# Patient Record
Sex: FEMALE | Race: WHITE | NOT HISPANIC OR LATINO | Employment: OTHER | URBAN - METROPOLITAN AREA
[De-identification: names, ages, dates, MRNs, and addresses within clinical notes are randomized per-mention and may not be internally consistent; named-entity substitution may affect disease eponyms.]

---

## 2017-05-15 ENCOUNTER — OFFICE VISIT (OUTPATIENT)
Dept: NEUROLOGY | Facility: CLINIC | Age: 62
End: 2017-05-15

## 2017-05-15 ENCOUNTER — APPOINTMENT (OUTPATIENT)
Dept: LAB | Facility: HOSPITAL | Age: 62
End: 2017-05-15

## 2017-05-15 VITALS
DIASTOLIC BLOOD PRESSURE: 74 MMHG | WEIGHT: 128.2 LBS | SYSTOLIC BLOOD PRESSURE: 116 MMHG | HEART RATE: 67 BPM | BODY MASS INDEX: 25.17 KG/M2 | OXYGEN SATURATION: 98 % | HEIGHT: 60 IN

## 2017-05-15 DIAGNOSIS — G40.009 PARTIAL IDIOPATHIC EPILEPSY WITH SEIZURES OF LOCALIZED ONSET, NOT INTRACTABLE, WITHOUT STATUS EPILEPTICUS (HCC): Primary | ICD-10-CM

## 2017-05-15 DIAGNOSIS — T50.905D ADVERSE DRUG EFFECT, SUBSEQUENT ENCOUNTER: ICD-10-CM

## 2017-05-15 LAB — PHENYTOIN SERPL-MCNC: 22.8 MCG/ML (ref 10–20)

## 2017-05-15 PROCEDURE — 80185 ASSAY OF PHENYTOIN TOTAL: CPT | Performed by: PSYCHIATRY & NEUROLOGY

## 2017-05-15 PROCEDURE — 80186 ASSAY OF PHENYTOIN FREE: CPT | Performed by: PSYCHIATRY & NEUROLOGY

## 2017-05-15 PROCEDURE — 36415 COLL VENOUS BLD VENIPUNCTURE: CPT | Performed by: PSYCHIATRY & NEUROLOGY

## 2017-05-15 PROCEDURE — 99213 OFFICE O/P EST LOW 20 MIN: CPT | Performed by: PSYCHIATRY & NEUROLOGY

## 2017-05-15 PROCEDURE — 80203 DRUG SCREEN QUANT ZONISAMIDE: CPT | Performed by: PSYCHIATRY & NEUROLOGY

## 2017-05-17 LAB — PHENYTOIN FREE SERPL-MCNC: 1.7 UG/ML (ref 1–2)

## 2017-05-18 LAB — ZONISAMIDE SERPL-MCNC: 9.4 UG/ML (ref 10–40)

## 2017-06-26 RX ORDER — PHENYTOIN SODIUM 100 MG/1
CAPSULE, EXTENDED RELEASE ORAL
Qty: 270 CAPSULE | Refills: 2 | Status: SHIPPED | OUTPATIENT
Start: 2017-06-26 | End: 2018-08-19 | Stop reason: SDUPTHER

## 2017-08-31 RX ORDER — EXTENDED PHENYTOIN SODIUM 30 MG/1
CAPSULE ORAL
Qty: 90 CAPSULE | Refills: 3 | Status: SHIPPED | OUTPATIENT
Start: 2017-08-31 | End: 2018-10-11 | Stop reason: SDUPTHER

## 2017-08-31 RX ORDER — ZONISAMIDE 100 MG/1
CAPSULE ORAL
Qty: 270 CAPSULE | Refills: 3 | Status: SHIPPED | OUTPATIENT
Start: 2017-08-31 | End: 2018-10-11 | Stop reason: SDUPTHER

## 2018-05-01 ENCOUNTER — APPOINTMENT (OUTPATIENT)
Dept: LAB | Facility: HOSPITAL | Age: 63
End: 2018-05-01

## 2018-05-01 ENCOUNTER — OFFICE VISIT (OUTPATIENT)
Dept: NEUROLOGY | Facility: CLINIC | Age: 63
End: 2018-05-01

## 2018-05-01 VITALS
WEIGHT: 131 LBS | BODY MASS INDEX: 25.72 KG/M2 | HEART RATE: 70 BPM | SYSTOLIC BLOOD PRESSURE: 136 MMHG | OXYGEN SATURATION: 95 % | HEIGHT: 60 IN | DIASTOLIC BLOOD PRESSURE: 78 MMHG

## 2018-05-01 DIAGNOSIS — G40.009 PARTIAL IDIOPATHIC EPILEPSY WITH SEIZURES OF LOCALIZED ONSET, NOT INTRACTABLE, WITHOUT STATUS EPILEPTICUS (HCC): Primary | ICD-10-CM

## 2018-05-01 LAB — PHENYTOIN SERPL-MCNC: 23.5 MCG/ML (ref 10–20)

## 2018-05-01 PROCEDURE — 80185 ASSAY OF PHENYTOIN TOTAL: CPT | Performed by: PSYCHIATRY & NEUROLOGY

## 2018-05-01 PROCEDURE — 80186 ASSAY OF PHENYTOIN FREE: CPT | Performed by: PSYCHIATRY & NEUROLOGY

## 2018-05-01 PROCEDURE — 80203 DRUG SCREEN QUANT ZONISAMIDE: CPT | Performed by: PSYCHIATRY & NEUROLOGY

## 2018-05-01 PROCEDURE — 36415 COLL VENOUS BLD VENIPUNCTURE: CPT | Performed by: PSYCHIATRY & NEUROLOGY

## 2018-05-01 PROCEDURE — 99213 OFFICE O/P EST LOW 20 MIN: CPT | Performed by: PSYCHIATRY & NEUROLOGY

## 2018-05-01 NOTE — PROGRESS NOTES
Subjective:     Patient ID: Daylin Rodriguez is a 63 y.o. female.    History of Present Illness  The following portions of the patient's history were reviewed and updated as appropriate: allergies, current medications, past family history, past medical history, past social history, past surgical history and problem list.    Epilepsy: Onset in 1997 without aura, described as a generalized tonic-clonic seizure.Idiopathic.  EEG showed mild left temporal slowing. Brain MRI and CT were normal. She was started on Dilantin. Another seizure the following year occurred with a Dilantin level of 15 but she had been sleep deprived.     Over the next decade dil levels fluctuated. The dose sometimes dropped as low as 300 per day or as high as 330 per day -- she had more auras in 2008 requiring another dose increase, as the Dilantin level was only 14.      Next seizure  was treated at the [facility] in March 2011. The Dilantin level was 12.5. Due to that history I added zonisamide.      She has done well with this combination without seizures or auras.     Current dose is dil branded, 300 mg daily and 30 mg every other day. Zonisamide is a generic 100 mg capsules. The doses 3 at night. She notes no side effects. There are no timing issues other than sleep deprivation can aggravate her seizures.     AE- mild ataxia noted in the past, all stable. Mild chronic stable memory sx as well, from epilpssy. NO  PN.  Review of Systems   Constitutional: Negative for activity change, appetite change and fatigue.   HENT: Negative for ear pain, facial swelling and trouble swallowing.    Eyes: Negative for photophobia, pain and visual disturbance.   Respiratory: Negative for choking, chest tightness and shortness of breath.    Cardiovascular: Negative for chest pain, palpitations and leg swelling.   Gastrointestinal: Negative for abdominal pain, constipation and nausea.   Endocrine: Negative for polydipsia and polyuria.    Genitourinary: Negative for difficulty urinating, frequency and urgency.   Musculoskeletal: Negative for back pain, gait problem and neck pain.   Skin: Negative for color change, rash and wound.   Allergic/Immunologic: Negative for environmental allergies, food allergies and immunocompromised state.   Neurological: Negative for dizziness, tremors, seizures, syncope, facial asymmetry, speech difficulty, weakness, light-headedness, numbness and headaches.   Hematological: Negative for adenopathy. Does not bruise/bleed easily.   Psychiatric/Behavioral: Negative for agitation, behavioral problems, confusion, decreased concentration, dysphoric mood, hallucinations, self-injury, sleep disturbance and suicidal ideas. The patient is not nervous/anxious and is not hyperactive.         Objective:    Neurologic Exam     Mental Status   Oriented to person, place, and time.   Attention: decreased. Concentration: decreased.   Speech: speech is normal   Level of consciousness: alert  Knowledge: good.   Able to name object. Able to read. Able to repeat. Able to write. Normal comprehension.     Cranial Nerves     CN II   Visual fields full to confrontation.     CN III, IV, VI   Pupils are equal, round, and reactive to light.    CN VII   Facial expression full, symmetric.     CN VIII   CN VIII normal.     CN XI   CN XI normal.     CN XII   CN XII normal.   Normal fundi     Motor Exam   Muscle bulk: normal  Overall muscle tone: normal    Sensory Exam   Light touch normal.   Vibration normal.     Gait, Coordination, and Reflexes     Gait  Gait: normal    Coordination   Romberg: negative  Finger to nose coordination: normal  Tandem walking coordination: normal    Tremor   Resting tremor: absent  Intention tremor: absent  Action tremor: absent    Reflexes   Right brachioradialis: 1+  Left brachioradialis: 1+  Right biceps: 1+  Left biceps: 1+  Right triceps: 1+  Left triceps: 1+  Right patellar: 1+  Left patellar: 1+  Right achilles:  1+  Left achilles: 1+  Right : 1+  Left : 1+      Physical Exam   Constitutional: She is oriented to person, place, and time. She appears well-developed and well-nourished.   Eyes: Pupils are equal, round, and reactive to light.   Neck: Normal range of motion. Neck supple.   Cardiovascular: Normal rate.    Pulmonary/Chest: Effort normal.   Neurological: She is oriented to person, place, and time. She has a normal Finger-Nose-Finger Test, a normal Romberg Test and a normal Tandem Gait Test. Gait normal.   Reflex Scores:       Tricep reflexes are 1+ on the right side and 1+ on the left side.       Bicep reflexes are 1+ on the right side and 1+ on the left side.       Brachioradialis reflexes are 1+ on the right side and 1+ on the left side.       Patellar reflexes are 1+ on the right side and 1+ on the left side.       Achilles reflexes are 1+ on the right side and 1+ on the left side.  Psychiatric: She has a normal mood and affect. Her speech is normal and behavior is normal. Judgment and thought content normal.   Nursing note and vitals reviewed.      Assessment/Plan:       Problems Addressed this Visit        Unprioritized    Partial idiopathic epilepsy with seizures of localized onset, not intractable, without status epilepticus - Primary    Relevant Orders    Zonisamide Level    Phenytoin Level, Free    Phenytoin Level, Total      Other Visit Diagnoses    None.            She is doing well.  Blood levels reordered for today including Dilantin free level.  No driving restrictions.  Follow-up annual.

## 2018-05-03 LAB
PHENYTOIN FREE SERPL-MCNC: 1.6 UG/ML (ref 1–2)
ZONISAMIDE SERPL-MCNC: 10.9 UG/ML (ref 10–40)

## 2018-08-20 RX ORDER — PHENYTOIN SODIUM 100 MG/1
CAPSULE, EXTENDED RELEASE ORAL
Qty: 270 CAPSULE | Refills: 2 | Status: SHIPPED | OUTPATIENT
Start: 2018-08-20 | End: 2019-06-10 | Stop reason: SDUPTHER

## 2018-10-12 RX ORDER — ZONISAMIDE 100 MG/1
CAPSULE ORAL
Qty: 270 CAPSULE | Refills: 3 | Status: SHIPPED | OUTPATIENT
Start: 2018-10-12 | End: 2018-10-12 | Stop reason: SDUPTHER

## 2018-10-12 RX ORDER — EXTENDED PHENYTOIN SODIUM 30 MG/1
CAPSULE ORAL
Qty: 90 CAPSULE | Refills: 3 | Status: SHIPPED | OUTPATIENT
Start: 2018-10-12 | End: 2019-06-10 | Stop reason: SDUPTHER

## 2018-10-12 RX ORDER — ZONISAMIDE 100 MG/1
300 CAPSULE ORAL DAILY
Qty: 30 CAPSULE | Refills: 0 | Status: SHIPPED | OUTPATIENT
Start: 2018-10-12 | End: 2019-06-10 | Stop reason: SDUPTHER

## 2019-06-10 ENCOUNTER — APPOINTMENT (OUTPATIENT)
Dept: LAB | Facility: HOSPITAL | Age: 64
End: 2019-06-10

## 2019-06-10 ENCOUNTER — OFFICE VISIT (OUTPATIENT)
Dept: NEUROLOGY | Facility: CLINIC | Age: 64
End: 2019-06-10

## 2019-06-10 VITALS
OXYGEN SATURATION: 98 % | BODY MASS INDEX: 25.32 KG/M2 | HEIGHT: 60 IN | DIASTOLIC BLOOD PRESSURE: 70 MMHG | WEIGHT: 129 LBS | SYSTOLIC BLOOD PRESSURE: 100 MMHG | HEART RATE: 80 BPM

## 2019-06-10 DIAGNOSIS — R27.0: ICD-10-CM

## 2019-06-10 DIAGNOSIS — M85.80 OSTEOPENIA DETERMINED BY X-RAY: ICD-10-CM

## 2019-06-10 DIAGNOSIS — T50.905D ADVERSE DRUG EFFECT, SUBSEQUENT ENCOUNTER: ICD-10-CM

## 2019-06-10 DIAGNOSIS — G40.009 PARTIAL IDIOPATHIC EPILEPSY WITH SEIZURES OF LOCALIZED ONSET, NOT INTRACTABLE, WITHOUT STATUS EPILEPTICUS (HCC): Primary | ICD-10-CM

## 2019-06-10 LAB — PHENYTOIN SERPL-MCNC: 21.3 MCG/ML (ref 10–20)

## 2019-06-10 PROCEDURE — 80186 ASSAY OF PHENYTOIN FREE: CPT | Performed by: PSYCHIATRY & NEUROLOGY

## 2019-06-10 PROCEDURE — 99214 OFFICE O/P EST MOD 30 MIN: CPT | Performed by: PSYCHIATRY & NEUROLOGY

## 2019-06-10 PROCEDURE — 80185 ASSAY OF PHENYTOIN TOTAL: CPT | Performed by: PSYCHIATRY & NEUROLOGY

## 2019-06-10 PROCEDURE — 80203 DRUG SCREEN QUANT ZONISAMIDE: CPT | Performed by: PSYCHIATRY & NEUROLOGY

## 2019-06-10 PROCEDURE — 36415 COLL VENOUS BLD VENIPUNCTURE: CPT | Performed by: PSYCHIATRY & NEUROLOGY

## 2019-06-10 RX ORDER — PHENYTOIN SODIUM 100 MG/1
300 CAPSULE, EXTENDED RELEASE ORAL DAILY
Qty: 270 CAPSULE | Refills: 2 | Status: SHIPPED | OUTPATIENT
Start: 2019-06-10 | End: 2019-11-19 | Stop reason: SDUPTHER

## 2019-06-10 RX ORDER — CETIRIZINE HYDROCHLORIDE 10 MG/1
10 TABLET ORAL AS NEEDED
COMMUNITY

## 2019-06-10 RX ORDER — EXTENDED PHENYTOIN SODIUM 30 MG/1
30 CAPSULE ORAL DAILY
Qty: 90 CAPSULE | Refills: 3 | Status: SHIPPED | OUTPATIENT
Start: 2019-06-10 | End: 2020-02-18 | Stop reason: SDUPTHER

## 2019-06-10 RX ORDER — ZONISAMIDE 100 MG/1
300 CAPSULE ORAL DAILY
Qty: 270 CAPSULE | Refills: 3 | Status: SHIPPED | OUTPATIENT
Start: 2019-06-10 | End: 2019-11-19 | Stop reason: SDUPTHER

## 2019-06-10 NOTE — PROGRESS NOTES
Subjective:     Patient ID: Daylin Rodriguez is a 64 y.o. female.    History of Present Illness  The following portions of the patient's history were reviewed and updated as appropriate: allergies, current medications, past family history, past medical history, past social history, past surgical history and problem list.  Epilepsy: Onset in 1997 without aura, described as a generalized tonic-clonic seizure      .Idiopathic.         EEG showed mild left temporal slowing.    Brain MRI and CT were normal. She was started on Dilantin.    There were no timing issues other than sleep deprivation can aggravate the seizures.     She had more auras in 2008 requiring another dose increase.  The Dilantin level was only 14.      The next seizure  was treated at the [facility] in March 2011. The Dilantin level was 12.5.                   Due to that history I added zonisamide.      She has done well with this combination of brand-name Dilantin and generic zonisamide without seizures or aura  since the addition of zonisamide in 2011     Current dose is  Dilantin branded, 330 milligrams on 4 days/week and 300 mg on 3 days/week Zonisamide is a generic 100 mg capsules @  3 at night. She notes no side effects.        AE- mild ataxia noted in the past, all stable. Mild chronic stable memory sx as well, from epilepsy. NO  PN.        Osteopenia- on Dexa from 2017, likely an AE of phenytoin. PCP treats with Calcium.    Review of systems discussed with patient.  Lab review from last year shows a free Dilantin level of 1.6 in May 2018  Review of Systems   Constitutional: Negative for activity change, appetite change and fatigue.   HENT: Negative for ear pain, facial swelling and trouble swallowing.    Eyes: Negative for photophobia, pain and visual disturbance.   Respiratory: Negative for choking, chest tightness and shortness of breath.    Cardiovascular: Negative for chest pain, palpitations and leg swelling.    Gastrointestinal: Negative for abdominal pain, constipation and nausea.   Endocrine: Negative for polydipsia, polyphagia and polyuria.   Genitourinary: Negative for difficulty urinating, genital sores and urgency.   Musculoskeletal: Negative for back pain, gait problem and neck pain.   Skin: Negative for color change, rash and wound.   Allergic/Immunologic: Negative for environmental allergies, food allergies and immunocompromised state.   Neurological: Negative for dizziness, tremors, seizures, syncope, facial asymmetry, speech difficulty, weakness, light-headedness, numbness and headaches.   Hematological: Negative for adenopathy. Does not bruise/bleed easily.   Psychiatric/Behavioral: Negative for agitation, behavioral problems, confusion, decreased concentration, dysphoric mood, hallucinations, self-injury, sleep disturbance and suicidal ideas. The patient is not nervous/anxious and is not hyperactive.         Objective:  Neurologic Exam      Mental Status   Oriented to person, place, and time.   Attention: decreased. Concentration: decreased.   Speech: speech is normal   Level of consciousness: alert  Knowledge: good.   Able to name object. Able to read. Able to repeat. Able to write. Normal comprehension.      Cranial Nerves      CN II   Visual fields full to confrontation.      CN III, IV, VI   Pupils are equal, round, and reactive to light at 4 mm     CN VII   Facial expression full, symmetric.      CN VIII   CN VIII normal.      CN XI   CN XI normal.      CN XII   CN XII normal.   Normal fundi      Motor Exam   Muscle bulk: normal  Overall muscle tone: normal     Sensory Exam   Light touch normal.   Vibration normal.  Even at the feet     Gait, Coordination, and Reflexes      Gait-very slightly wide-based.  Turns normally     Coordination   Romberg: negative  Finger to nose coordination: normal  Tandem walking coordination: normal     Tremor   Resting tremor: absent  Intention tremor: absent  Action  tremor: absent     Reflexes   Right brachioradialis: 1+  Left brachioradialis: 1+  Right biceps: 1+  Left biceps: 1+  Right triceps: 1+  Left triceps: 1+  Right patellar: tr  Left patellar: tr  Right achilles: 0  Left achilles: 0           Physical Exam   Constitutional: She is oriented to person, place, and time. She appears well-developed and well-nourished.   Eyes: Pupils are equal, round, and reactive to light.   Neck: Normal range of motion. Neck supple.   Cardiovascular: Normal rate.    Pulmonary/Chest: Effort normal.   Neurological: She is oriented to person, place, and time. She has a normal Finger-Nose-Finger Test, a normal Romberg Test and a normal Tandem Gait Test. Gait  is very slightly wide-based.  Turning is normal    Psychiatric: She has a normal mood and affect. Her speech is normal and behavior is normal. Judgment and thought content normal.   Nursing note and vitals reviewed.      Assessment/Plan:       Problems Addressed this Visit        Unprioritized    Partial idiopathic epilepsy with seizures of localized onset, not intractable, without status epilepticus (CMS/[facility]) - Primary    Relevant Medications    zonisamide (ZONEGRAN) 100 MG capsule    DILANTIN 30 MG ER capsule    DILANTIN 100 MG ER capsule    Other Relevant Orders    Phenytoin Level, Total    Phenytoin Level, Free    Zonisamide Level    Adverse drug effect, subsequent encounter    Ataxia on examination    Osteopenia determined by x-ray         In summary she has had a lifelong history of Dilantin use resulting in hyporeflexia mild ataxia osteopenia.  Seizure control was incomplete until zonisamide was added.  She does not want to switch off of brand-name Dilantin.    She will remain on the same dose of these medicines as reflected above with a repeat set of blood levels today.  Her osteopenia is being managed by her primary care physician.  The mild ataxia is an exam finding is does not seem to bother her clinically.    She wants to  follow-up with 1 of our physicians next year at the [facility].

## 2019-06-12 LAB
PHENYTOIN FREE SERPL-MCNC: 1.3 UG/ML (ref 1–2)
ZONISAMIDE SERPL-MCNC: 12.1 UG/ML (ref 10–40)

## 2019-11-19 RX ORDER — ZONISAMIDE 100 MG/1
300 CAPSULE ORAL DAILY
Qty: 270 CAPSULE | Refills: 3 | Status: SHIPPED | OUTPATIENT
Start: 2019-11-19 | End: 2020-02-18 | Stop reason: SDUPTHER

## 2019-11-19 RX ORDER — PHENYTOIN SODIUM 100 MG/1
300 CAPSULE, EXTENDED RELEASE ORAL DAILY
Qty: 270 CAPSULE | Refills: 2 | Status: SHIPPED | OUTPATIENT
Start: 2019-11-19 | End: 2020-02-18 | Stop reason: SDUPTHER

## 2019-11-19 NOTE — TELEPHONE ENCOUNTER
Patient has changed insurance & can no longer use [facility]. She is needing a new Rx sent to [facility] on file.

## 2020-02-17 RX ORDER — ZONISAMIDE 100 MG/1
300 CAPSULE ORAL DAILY
Qty: 270 CAPSULE | Refills: 3 | Status: CANCELLED | OUTPATIENT
Start: 2020-02-17

## 2020-02-17 RX ORDER — EXTENDED PHENYTOIN SODIUM 30 MG/1
30 CAPSULE ORAL DAILY
Qty: 90 CAPSULE | Refills: 3 | Status: CANCELLED | OUTPATIENT
Start: 2020-02-17

## 2020-02-17 RX ORDER — PHENYTOIN SODIUM 100 MG/1
300 CAPSULE, EXTENDED RELEASE ORAL DAILY
Qty: 270 CAPSULE | Refills: 2 | Status: CANCELLED | OUTPATIENT
Start: 2020-02-17

## 2020-02-17 NOTE — TELEPHONE ENCOUNTER
PATIENT NEEDS REFILLS ON 3 MEDS AND I HAVE UPDATED THE PHARMACY TO THE [facility] ON [facility].    [phone]

## 2020-02-18 ENCOUNTER — TELEPHONE (OUTPATIENT)
Dept: NEUROLOGY | Facility: CLINIC | Age: 65
End: 2020-02-18

## 2020-02-18 RX ORDER — ZONISAMIDE 100 MG/1
300 CAPSULE ORAL DAILY
Qty: 270 CAPSULE | Refills: 1 | Status: SHIPPED | OUTPATIENT
Start: 2020-02-18 | End: 2020-02-19 | Stop reason: SDUPTHER

## 2020-02-18 RX ORDER — PHENYTOIN SODIUM 100 MG/1
300 CAPSULE, EXTENDED RELEASE ORAL DAILY
Qty: 270 CAPSULE | Refills: 1 | Status: SHIPPED | OUTPATIENT
Start: 2020-02-18 | End: 2020-02-19 | Stop reason: SDUPTHER

## 2020-02-18 RX ORDER — EXTENDED PHENYTOIN SODIUM 30 MG/1
30 CAPSULE ORAL DAILY
Qty: 90 CAPSULE | Refills: 1 | Status: SHIPPED | OUTPATIENT
Start: 2020-02-18 | End: 2020-02-19 | Stop reason: SDUPTHER

## 2020-02-18 NOTE — TELEPHONE ENCOUNTER
PT CALLED YESTERDAY REGARDING 3 RX REFILLS, SHE WANTED TO CALL TO MAKE [facility].     [phone]

## 2020-02-19 RX ORDER — ZONISAMIDE 100 MG/1
300 CAPSULE ORAL DAILY
Qty: 270 CAPSULE | Refills: 1 | Status: SHIPPED | OUTPATIENT
Start: 2020-02-19 | End: 2020-08-25

## 2020-02-19 RX ORDER — EXTENDED PHENYTOIN SODIUM 30 MG/1
30 CAPSULE ORAL DAILY
Qty: 90 CAPSULE | Refills: 1 | Status: SHIPPED | OUTPATIENT
Start: 2020-02-19 | End: 2020-11-17

## 2020-02-19 RX ORDER — PHENYTOIN SODIUM 100 MG/1
300 CAPSULE, EXTENDED RELEASE ORAL DAILY
Qty: 270 CAPSULE | Refills: 1 | Status: SHIPPED | OUTPATIENT
Start: 2020-02-19 | End: 2020-08-25

## 2020-06-05 ENCOUNTER — OFFICE VISIT (OUTPATIENT)
Dept: NEUROLOGY | Facility: CLINIC | Age: 65
End: 2020-06-05

## 2020-06-05 ENCOUNTER — LAB (OUTPATIENT)
Dept: LAB | Facility: HOSPITAL | Age: 65
End: 2020-06-05

## 2020-06-05 VITALS
WEIGHT: 129 LBS | SYSTOLIC BLOOD PRESSURE: 104 MMHG | HEIGHT: 60 IN | HEART RATE: 69 BPM | BODY MASS INDEX: 25.32 KG/M2 | OXYGEN SATURATION: 98 % | DIASTOLIC BLOOD PRESSURE: 70 MMHG

## 2020-06-05 DIAGNOSIS — G40.009 PARTIAL IDIOPATHIC EPILEPSY WITH SEIZURES OF LOCALIZED ONSET, NOT INTRACTABLE, WITHOUT STATUS EPILEPTICUS (HCC): Primary | ICD-10-CM

## 2020-06-05 LAB
ALBUMIN SERPL-MCNC: 4.4 G/DL (ref 3.5–5.2)
ALBUMIN/GLOB SERPL: 1.8 G/DL
ALP SERPL-CCNC: 99 U/L (ref 39–117)
ALT SERPL W P-5'-P-CCNC: 25 U/L (ref 1–33)
ANION GAP SERPL CALCULATED.3IONS-SCNC: 8.9 MMOL/L (ref 5–15)
AST SERPL-CCNC: 23 U/L (ref 1–32)
BASOPHILS # BLD AUTO: 0.03 10*3/MM3 (ref 0–0.2)
BASOPHILS NFR BLD AUTO: 0.6 % (ref 0–1.5)
BILIRUB SERPL-MCNC: <0.2 MG/DL (ref 0.2–1.2)
BUN BLD-MCNC: 14 MG/DL (ref 8–23)
BUN/CREAT SERPL: 20.3 (ref 7–25)
CALCIUM SPEC-SCNC: 8.9 MG/DL (ref 8.6–10.5)
CHLORIDE SERPL-SCNC: 106 MMOL/L (ref 98–107)
CO2 SERPL-SCNC: 25.1 MMOL/L (ref 22–29)
CREAT BLD-MCNC: 0.69 MG/DL (ref 0.57–1)
DEPRECATED RDW RBC AUTO: 49.4 FL (ref 37–54)
EOSINOPHIL # BLD AUTO: 0.05 10*3/MM3 (ref 0–0.4)
EOSINOPHIL NFR BLD AUTO: 1 % (ref 0.3–6.2)
ERYTHROCYTE [DISTWIDTH] IN BLOOD BY AUTOMATED COUNT: 13.6 % (ref 12.3–15.4)
FOLATE SERPL-MCNC: 7.92 NG/ML (ref 4.78–24.2)
GFR SERPL CREATININE-BSD FRML MDRD: 85 ML/MIN/1.73
GLOBULIN UR ELPH-MCNC: 2.5 GM/DL
GLUCOSE BLD-MCNC: 81 MG/DL (ref 65–99)
HCT VFR BLD AUTO: 38.6 % (ref 34–46.6)
HGB BLD-MCNC: 13.1 G/DL (ref 12–15.9)
IMM GRANULOCYTES # BLD AUTO: 0.01 10*3/MM3 (ref 0–0.05)
IMM GRANULOCYTES NFR BLD AUTO: 0.2 % (ref 0–0.5)
LYMPHOCYTES # BLD AUTO: 2.41 10*3/MM3 (ref 0.7–3.1)
LYMPHOCYTES NFR BLD AUTO: 46.3 % (ref 19.6–45.3)
MCH RBC QN AUTO: 32.9 PG (ref 26.6–33)
MCHC RBC AUTO-ENTMCNC: 33.9 G/DL (ref 31.5–35.7)
MCV RBC AUTO: 97 FL (ref 79–97)
MONOCYTES # BLD AUTO: 0.51 10*3/MM3 (ref 0.1–0.9)
MONOCYTES NFR BLD AUTO: 9.8 % (ref 5–12)
NEUTROPHILS # BLD AUTO: 2.2 10*3/MM3 (ref 1.7–7)
NEUTROPHILS NFR BLD AUTO: 42.1 % (ref 42.7–76)
NRBC BLD AUTO-RTO: 0 /100 WBC (ref 0–0.2)
PLATELET # BLD AUTO: 219 10*3/MM3 (ref 140–450)
PMV BLD AUTO: 10.3 FL (ref 6–12)
POTASSIUM BLD-SCNC: 3.9 MMOL/L (ref 3.5–5.2)
PROT SERPL-MCNC: 6.9 G/DL (ref 6–8.5)
RBC # BLD AUTO: 3.98 10*6/MM3 (ref 3.77–5.28)
SODIUM BLD-SCNC: 140 MMOL/L (ref 136–145)
WBC NRBC COR # BLD: 5.21 10*3/MM3 (ref 3.4–10.8)

## 2020-06-05 PROCEDURE — 80053 COMPREHEN METABOLIC PANEL: CPT | Performed by: PSYCHIATRY & NEUROLOGY

## 2020-06-05 PROCEDURE — 36415 COLL VENOUS BLD VENIPUNCTURE: CPT | Performed by: PSYCHIATRY & NEUROLOGY

## 2020-06-05 PROCEDURE — 80186 ASSAY OF PHENYTOIN FREE: CPT | Performed by: PSYCHIATRY & NEUROLOGY

## 2020-06-05 PROCEDURE — 82746 ASSAY OF FOLIC ACID SERUM: CPT | Performed by: PSYCHIATRY & NEUROLOGY

## 2020-06-05 PROCEDURE — 80185 ASSAY OF PHENYTOIN TOTAL: CPT | Performed by: PSYCHIATRY & NEUROLOGY

## 2020-06-05 PROCEDURE — 99214 OFFICE O/P EST MOD 30 MIN: CPT | Performed by: PSYCHIATRY & NEUROLOGY

## 2020-06-05 PROCEDURE — 85025 COMPLETE CBC W/AUTO DIFF WBC: CPT | Performed by: PSYCHIATRY & NEUROLOGY
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
## 2020-06-05 NOTE — PATIENT INSTRUCTIONS
[facility]  Deja Parsons MD  Neurology clinic  [phone]    With anti-seizure medications, you may initially notice side effects of fatigue, drowsiness, unsteadiness, and dizziness.  Other possible side effects include nausea, abdominal pain, headache, blurry or double vision, slurred speech and mood changes.  Generally, patients will noticed these symptoms when the medication is first started or with higher doses and will go away with time.    It is import to consistently take your medication every day.  Missing just one dose may put you at risk for a breakthrough seizure.  Consider using reminders on your phone or a pill box.    If you develop a rash, please call the neurology clinic immediately or notify another healthcare professional, as this may be potentially life-threatening.  If you are unable to reach a healthcare professional, go to the emergency room immediately for further evaluation.    If you develop thoughts of wanting to hurt yourself or others, please call the neurology clinic immediately to notify another healthcare professional.  If you are unable to reach a healthcare professional, go to the emergency room immediately for further evaluation.    Taking anti-seizure medications may increase the risk of birth defects.  If you are a female of child-bearing potential, it is recommended that you take folic acid (1-4 mg) daily.  This may reduce the risk of birth defects while pregnant and taking seizure medication.  If you become pregnant, contact our office immediately. You will need to be followed very closely (at least monthly appointments).  I also recommend contacting The North American Antiepileptic Drug Pregnancy Registry at www.aedpregnancyregistry.org or 1-461.579.1628.    It is the [place] state law that you cannot drive within 90 days of a seizure.    You should avoid certain activities that if you were to have a seizure, you could harm yourself or others. In  general, it is recommended that you avoid operating heavy machinery or power tools, swimming or taking baths by yourself (showers are ok), don't stand over open flames, don't get on high ladders or the roof.  I also recommend to avoid sleeping on your stomach.    For further information on epilepsy and resources available to patients and their families, please visit the [facility] at www.efky.org or call [phone].    **Check out the [facility]'s monthly Art Group Gathering.  They are [facility] at [facility], [address] [place] [address].  Call Ceci Alves at [phone] or email her at [email or web address] for the dates of future gatherings.**      **If you have having memory problems, consider HOBSCOTCH (Home-Based Self-management and Cognitive Training Changes lives).  It is an 8 week self-management program for adults with epilepsy and memory problems.  The program is free at the [facility].  Contact Sarahi Ames at [phone] or [email or web address].**    Check out My Epilepsy Story (MyEpilepsyStory.org).  Their goal is to foster the growth of young girls and women affected by epilepsy and encourage them not just to lives, but to THRIVE!  You can get involved by donating, sharing your story, or becoming an ambassador.  Services include educational resources and transportation.

## 2020-06-08 LAB
PHENYTOIN FREE SERPL-MCNC: 1.2 UG/ML (ref 1–2)
PHENYTOIN SERPL-MCNC: 19.2 UG/ML (ref 10–20)

## 2020-08-25 RX ORDER — PHENYTOIN SODIUM 100 MG/1
CAPSULE, EXTENDED RELEASE ORAL
Qty: 270 CAPSULE | Refills: 0 | Status: SHIPPED | OUTPATIENT
Start: 2020-08-25 | End: 2020-11-17

## 2020-08-25 RX ORDER — ZONISAMIDE 100 MG/1
CAPSULE ORAL
Qty: 270 CAPSULE | Refills: 0 | Status: SHIPPED | OUTPATIENT
Start: 2020-08-25 | End: 2020-11-17

## 2020-11-09 ENCOUNTER — PATIENT MESSAGE (OUTPATIENT)
Dept: NEUROLOGY | Facility: CLINIC | Age: 65
End: 2020-11-09

## 2020-11-10 NOTE — TELEPHONE ENCOUNTER
"From: Daylin Rodriguez  To: Deja Parsons MD  Sent: 11/9/2020 8:06 PM EST  Subject: Non-Urgent Medical Question    Hi Dr. Parsons,  I'm writing to ask if  you could help me with something.   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   I served on  [employer] twice when I was younger before I had seizures, and found the experience to be very stressful both times.  Now I worry that serving again with the stress involved will trigger a seizure.  Would you mind completing the \"General Excuse\" section of the attached form so I can be granted a medical exemption from serving?   I can  the completed form at your office or you can send it back to me since it has to be returned within 5 days.  Thanks for your help.   Daylin Rodriguez  "

## 2020-11-17 RX ORDER — ZONISAMIDE 100 MG/1
CAPSULE ORAL
Qty: 270 CAPSULE | Refills: 3 | Status: SHIPPED | OUTPATIENT
Start: 2020-11-17 | End: 2021-11-02 | Stop reason: SDUPTHER

## 2020-11-17 RX ORDER — EXTENDED PHENYTOIN SODIUM 30 MG/1
CAPSULE ORAL
Qty: 90 CAPSULE | Refills: 3 | Status: SHIPPED | OUTPATIENT
Start: 2020-11-17 | End: 2022-04-27

## 2020-11-17 RX ORDER — PHENYTOIN SODIUM 100 MG/1
CAPSULE, EXTENDED RELEASE ORAL
Qty: 270 CAPSULE | Refills: 3 | Status: SHIPPED | OUTPATIENT
Start: 2020-11-17 | End: 2021-11-02 | Stop reason: SDUPTHER

## 2021-03-16 ENCOUNTER — BULK ORDERING (OUTPATIENT)
Dept: CASE MANAGEMENT | Facility: OTHER | Age: 66
End: 2021-03-16

## 2021-03-16 DIAGNOSIS — Z23 IMMUNIZATION DUE: ICD-10-CM

## 2021-07-02 ENCOUNTER — LAB (OUTPATIENT)
Dept: LAB | Facility: HOSPITAL | Age: 66
End: 2021-07-02

## 2021-07-02 ENCOUNTER — OFFICE VISIT (OUTPATIENT)
Dept: NEUROLOGY | Facility: CLINIC | Age: 66
End: 2021-07-02

## 2021-07-02 VITALS
SYSTOLIC BLOOD PRESSURE: 126 MMHG | HEART RATE: 94 BPM | OXYGEN SATURATION: 98 % | DIASTOLIC BLOOD PRESSURE: 82 MMHG | BODY MASS INDEX: 25.91 KG/M2 | HEIGHT: 60 IN | WEIGHT: 132 LBS

## 2021-07-02 DIAGNOSIS — Z79.899 HIGH RISK MEDICATION USE: ICD-10-CM

## 2021-07-02 DIAGNOSIS — G40.009 PARTIAL IDIOPATHIC EPILEPSY WITH SEIZURES OF LOCALIZED ONSET, NOT INTRACTABLE, WITHOUT STATUS EPILEPTICUS (HCC): Primary | ICD-10-CM

## 2021-07-02 LAB
BASOPHILS # BLD AUTO: 0.02 10*3/MM3 (ref 0–0.2)
BASOPHILS NFR BLD AUTO: 0.5 % (ref 0–1.5)
DEPRECATED RDW RBC AUTO: 47.7 FL (ref 37–54)
EOSINOPHIL # BLD AUTO: 0.06 10*3/MM3 (ref 0–0.4)
EOSINOPHIL NFR BLD AUTO: 1.4 % (ref 0.3–6.2)
ERYTHROCYTE [DISTWIDTH] IN BLOOD BY AUTOMATED COUNT: 13.2 % (ref 12.3–15.4)
HCT VFR BLD AUTO: 38.9 % (ref 34–46.6)
HGB BLD-MCNC: 13.2 G/DL (ref 12–15.9)
IMM GRANULOCYTES # BLD AUTO: 0.01 10*3/MM3 (ref 0–0.05)
IMM GRANULOCYTES NFR BLD AUTO: 0.2 % (ref 0–0.5)
LYMPHOCYTES # BLD AUTO: 2.01 10*3/MM3 (ref 0.7–3.1)
LYMPHOCYTES NFR BLD AUTO: 46.7 % (ref 19.6–45.3)
MCH RBC QN AUTO: 32.8 PG (ref 26.6–33)
MCHC RBC AUTO-ENTMCNC: 33.9 G/DL (ref 31.5–35.7)
MCV RBC AUTO: 96.5 FL (ref 79–97)
MONOCYTES # BLD AUTO: 0.49 10*3/MM3 (ref 0.1–0.9)
MONOCYTES NFR BLD AUTO: 11.4 % (ref 5–12)
NEUTROPHILS NFR BLD AUTO: 1.71 10*3/MM3 (ref 1.7–7)
NEUTROPHILS NFR BLD AUTO: 39.8 % (ref 42.7–76)
NRBC BLD AUTO-RTO: 0 /100 WBC (ref 0–0.2)
PLATELET # BLD AUTO: 206 10*3/MM3 (ref 140–450)
PMV BLD AUTO: 10.5 FL (ref 6–12)
RBC # BLD AUTO: 4.03 10*6/MM3 (ref 3.77–5.28)
WBC # BLD AUTO: 4.3 10*3/MM3 (ref 3.4–10.8)

## 2021-07-02 PROCEDURE — 99213 OFFICE O/P EST LOW 20 MIN: CPT | Performed by: PSYCHIATRY & NEUROLOGY

## 2021-07-02 PROCEDURE — 36415 COLL VENOUS BLD VENIPUNCTURE: CPT | Performed by: PSYCHIATRY & NEUROLOGY

## 2021-07-02 PROCEDURE — 85025 COMPLETE CBC W/AUTO DIFF WBC: CPT | Performed by: PSYCHIATRY & NEUROLOGY

## 2021-07-02 PROCEDURE — 80185 ASSAY OF PHENYTOIN TOTAL: CPT | Performed by: PSYCHIATRY & NEUROLOGY

## 2021-07-02 PROCEDURE — 80186 ASSAY OF PHENYTOIN FREE: CPT | Performed by: PSYCHIATRY & NEUROLOGY

## 2021-07-02 RX ORDER — ATORVASTATIN CALCIUM 10 MG/1
10 TABLET, FILM COATED ORAL NIGHTLY
COMMUNITY
Start: 2021-05-04

## 2021-07-02 NOTE — PATIENT INSTRUCTIONS
[facility]  Deja Parsons MD  Neurology clinic  [phone]    With anti-seizure medications, you may initially notice side effects of fatigue, drowsiness, unsteadiness, and dizziness.  Other possible side effects include nausea, abdominal pain, headache, blurry or double vision, slurred speech and mood changes.  Generally, patients will noticed these symptoms when the medication is first started or with higher doses and will go away with time.    It is import to consistently take your medication every day.  Missing just one dose may put you at risk for a breakthrough seizure.  Consider using reminders on your phone or a pill box.    If you develop a rash, please call the neurology clinic immediately or notify another healthcare professional, as this may be potentially life-threatening.  If you are unable to reach a healthcare professional, go to the emergency room immediately for further evaluation.    If you develop thoughts of wanting to hurt yourself or others, please call the neurology clinic immediately to notify another healthcare professional.  If you are unable to reach a healthcare professional, go to the emergency room immediately for further evaluation.    Taking anti-seizure medications may increase the risk of birth defects.  If you are a female of child-bearing potential, it is recommended that you take folic acid (1-4 mg) daily.  This may reduce the risk of birth defects while pregnant and taking seizure medication.  If you become pregnant, contact our office immediately. You will need to be followed very closely (at least monthly appointments).  I also recommend contacting The North American Antiepileptic Drug Pregnancy Registry at www.aedpregnancyregistry.org or 1-841.413.1409.    It is the [place] state law that you cannot drive within 90 days of a seizure.    You should avoid certain activities that if you were to have a seizure, you could harm yourself or others. In  general, it is recommended that you avoid operating heavy machinery or power tools, swimming or taking baths by yourself (showers are ok), don't stand over open flames, don't get on high ladders or the roof.  I also recommend to avoid sleeping on your stomach.    For further information on epilepsy and resources available to patients and their families, please visit the [facility] at www.efky.org or call [phone].    **Check out the [facility]'s monthly Art Group Gathering.  They are [facility], [address] [place] [address].  Call Ceci Alves at [phone] or email her at [email or web address] for the dates of future gatherings.**      **If you have having memory problems, consider HOBSCOTCH (Home-Based Self-management and Cognitive Training Changes lives).  It is an 8 week self-management program for adults with epilepsy and memory problems.  The program is free at the [facility].  Contact Sarahi Ames at [phone] or [email or web address].**    Check out My Epilepsy Story (MyEpilepsyStory.[email or web address]).  Their goal is to foster the growth of young girls and women affected by epilepsy and encourage them not just to lives, but to THRIVE!  You can get involved by donating, sharing your story, or becoming an ambassador.  Services include educational resources and transportation.

## 2021-07-02 NOTE — PROGRESS NOTES
Subjective:     Patient ID: Daylin Rodriguez is a 66 y.o. female.    The patient is a 66-year-old right-handed female with a history of epilepsy who presents to the neurology clinic today as an established patient for follow-up for seizures.  She was last seen as a new patient to me on June 5, 2020.  The patient is only had 2 big seizures.  One was in 1997 and one was in 2001.  She also reports smaller seizures of spacing out lasting 1 to 2 minutes but fortunately she is remained seizure-free since 2011.  She had a DEXA scan in 2017 that showed osteopenia.  She had an EEG that showed left temporal slowing and MRI that was reportedly normal.  The patient is on name brand only Dilantin extended release 300 mg alternating with 330 mg.  She also takes in a semithree 100 mg at night.  She denies any seizures since her last visit.  She reports that the medications are affordable.  She does have some problems with mental math.  She is a retired  from the [employer] department.  She takes her medications at night.  On August 17, 2020 her CBC was essentially normal.  On December 23, 2020 her CMP was normal.  On June 5, 2020 her folate and Dilantin levels were normal.    The following portions of the patient's history were reviewed and updated as appropriate: allergies, current medications, past family history, past medical history, past social history, past surgical history and problem list.    Review of Systems     Objective:    Neurologic Exam    Physical Exam    Assessment/Plan:    The patient is a 66-year-old right-handed female with a history of epilepsy who presents today for follow-up.    1.  Unknown epilepsy-the patient may have focal onset seizures originating from the left temporal head region.  Fortunately she remains seizure-free without major side effects to her medications.  I recommend continue on the current dose with no changes.  I would like to get a CBC and Dilantin levels today for drug  monitoring.  She will be due for DEXA scan in 2022.    We will see the patient back in 1 years time or sooner if needed.    A total of 20 minutes of time was spent on this encounter today.  This includes reviewing patient's records, face-to-face time, and documentation.       Problems Addressed this Visit        Neuro    Partial idiopathic epilepsy with seizures of localized onset, not intractable, without status epilepticus (CMS/HCC) - Primary    Relevant Orders    Phenytoin Level, Total & Free    CBC & Differential      Other Visit Diagnoses     High risk medication use        Relevant Orders    Phenytoin Level, Total & Free    CBC & Differential      Diagnoses       Codes Comments    Partial idiopathic epilepsy with seizures of localized onset, not intractable, without status epilepticus (CMS/HCC)    -  Primary ICD-10-CM: G40.009  ICD-9-CM: 345.50     High risk medication use     ICD-10-CM: Z79.899  ICD-9-CM: V58.69

## 2021-07-05 LAB
PHENYTOIN FREE SERPL-MCNC: 1.5 UG/ML (ref 1–2)
PHENYTOIN SERPL-MCNC: 28.1 UG/ML (ref 10–20)

## 2021-11-02 RX ORDER — ZONISAMIDE 100 MG/1
300 CAPSULE ORAL DAILY
Qty: 270 CAPSULE | Refills: 3 | Status: SHIPPED | OUTPATIENT
Start: 2021-11-02 | End: 2022-10-19

## 2021-11-02 RX ORDER — PHENYTOIN SODIUM 100 MG/1
CAPSULE, EXTENDED RELEASE ORAL
Qty: 270 CAPSULE | Refills: 3 | Status: SHIPPED | OUTPATIENT
Start: 2021-11-02 | End: 2022-10-24 | Stop reason: SDUPTHER

## 2022-03-17 ENCOUNTER — TELEPHONE (OUTPATIENT)
Dept: NEUROLOGY | Facility: CLINIC | Age: 67
End: 2022-03-17

## 2022-03-17 NOTE — TELEPHONE ENCOUNTER
Caller: RhonaDaylin dc    Relationship: Self    Best call back number: [phone]    Who is your current provider: DR. CARO ROUSE    Who would you like your new provider to be: ANY PROVIDER WHO [facility]    What are your reasons for transferring care: PT WAS ORIGINALLY A PT OF DR. CULLEN'S & TRANSFERRED CARE TO DR. ROUSE UPON HIS skilled nursing. PT LIVES ONLY 5 MINS FROM THE [facility] OFFICE & DUE TO DR. ROUSE'S CHANGE IN SCHEDULE, PT DOES NOT WISH TO TRAVEL TO [place].    Additional notes: PLEASE CALL WITH ANY ADDITIONAL QUESTIONS. I DID ADVISE PT THAT TRANSFERS OF CARE DO HAVE TO BE APPROVED PRIOR TO RESCHEDULING HER NEXT VISIT. PT VERBALIZED UNDERSTANDING.     PLEASE REVIEW AND ADVISE.

## 2022-04-27 RX ORDER — EXTENDED PHENYTOIN SODIUM 30 MG/1
CAPSULE ORAL
Qty: 90 CAPSULE | Refills: 0 | Status: SHIPPED | OUTPATIENT
Start: 2022-04-27 | End: 2022-10-24 | Stop reason: SDUPTHER

## 2022-07-05 ENCOUNTER — OFFICE VISIT (OUTPATIENT)
Dept: NEUROLOGY | Facility: CLINIC | Age: 67
End: 2022-07-05

## 2022-07-05 ENCOUNTER — LAB (OUTPATIENT)
Dept: LAB | Facility: HOSPITAL | Age: 67
End: 2022-07-05

## 2022-07-05 VITALS
HEIGHT: 60 IN | SYSTOLIC BLOOD PRESSURE: 128 MMHG | WEIGHT: 132 LBS | BODY MASS INDEX: 25.91 KG/M2 | HEART RATE: 80 BPM | DIASTOLIC BLOOD PRESSURE: 78 MMHG | RESPIRATION RATE: 16 BRPM

## 2022-07-05 DIAGNOSIS — G40.009 PARTIAL IDIOPATHIC EPILEPSY WITH SEIZURES OF LOCALIZED ONSET, NOT INTRACTABLE, WITHOUT STATUS EPILEPTICUS: ICD-10-CM

## 2022-07-05 DIAGNOSIS — M85.88 OTHER SPECIFIED DISORDERS OF BONE DENSITY AND STRUCTURE, OTHER SITE: ICD-10-CM

## 2022-07-05 DIAGNOSIS — G40.009 PARTIAL IDIOPATHIC EPILEPSY WITH SEIZURES OF LOCALIZED ONSET, NOT INTRACTABLE, WITHOUT STATUS EPILEPTICUS: Primary | ICD-10-CM

## 2022-07-05 DIAGNOSIS — Z79.899 HIGH RISK MEDICATION USE: ICD-10-CM

## 2022-07-05 LAB — PHENYTOIN SERPL-MCNC: 19.2 MCG/ML (ref 10–20)

## 2022-07-05 PROCEDURE — 80185 ASSAY OF PHENYTOIN TOTAL: CPT

## 2022-07-05 PROCEDURE — 99214 OFFICE O/P EST MOD 30 MIN: CPT | Performed by: STUDENT IN AN ORGANIZED HEALTH CARE EDUCATION/TRAINING PROGRAM

## 2022-07-05 PROCEDURE — 36415 COLL VENOUS BLD VENIPUNCTURE: CPT

## 2022-07-05 NOTE — PROGRESS NOTES
Chief Complaint   Patient presents with   • Seizures     Last seizure 11 years ago        Patient ID: Daylin Rodriguez is a 67 y.o. female.    HPI:    The following portions of the patient's history were reviewed and updated as appropriate: allergies, current medications, past family history, past medical history, past social history, past surgical history and problem list.    Review of Systems   Allergic/Immunologic: Negative for food allergies.   Neurological: Negative for dizziness, tremors, speech difficulty, numbness and headaches.   Hematological: Does not bruise/bleed easily.   Psychiatric/Behavioral: Negative for agitation, confusion, decreased concentration and sleep disturbance. The patient is not nervous/anxious.     Ms. Rollins is a 67-year-old right-handed female with a history of epilepsy.  She presents to the neurology clinic as an established patient for follow-up of seizures.  She was last seen by Dr. Deja Parsons on July 2021.  He has had 2 large seizures in 1997 in 2001 as well as smaller seizures with spacing out lasting 1 to 2 minutes.  She has remained seizure-free since 2011.  She takes Dilantin extended release 300 mg alternating with 330 mg.  She also takes zonisamide 300 mg at night.  Her vitamin D was checked in August 2021 and was low.  She had a CBC which was notable for a leukopenia at 4.07, her CMP was checked around this time as well and was reassuring.  She had a phenytoin level of 28.1 when checked a year ago with a normal free level. No side effects. No seizures since last appointment. The medication is affordable to her.    Has not tried other medications. Has some word finding difficulties and calculation issues but it's tolerable.    MRI/EEG - done years ago but they did not find anything abnormal on MRI. Unclear what EEG stated.    Vitals:    07/05/22 0858   BP: 128/78   Pulse: 80   Resp: 16       Neurologic Exam     Mental Status   Attention: normal. Concentration:  normal.   Speech: speech is normal   Level of consciousness: alert    Cranial Nerves     CN II   Visual fields full to confrontation.   Visual acuity: normal    CN III, IV, VI   Pupils are equal, round, and reactive to light.  Extraocular motions are normal.     CN V   Facial sensation intact.     CN VII   Facial expression full, symmetric.     CN VIII   Hearing: intact    CN IX, X   Palate: symmetric    CN XI   Right trapezius strength: normal  Left trapezius strength: normal    CN XII   Tongue: not atrophic  Fasciculations: absent  Tongue deviation: none    Motor Exam   Muscle bulk: normal    Strength   Right deltoid: 5/5  Left deltoid: 5/5  Right biceps: 5/5  Left biceps: 5/5  Right triceps: 5/5  Left triceps: 5/5  Right iliopsoas: 5/5  Left iliopsoas: 5/5  Right quadriceps: 5/5  Left quadriceps: 5/5  Right hamstrin/5  Left hamstrin/5  Right anterior tibial: 5/5  Left anterior tibial: 5/5  Right gastroc: 5/5  Left gastroc: 5/5Grip 5 out of 5 bilaterally     Sensory Exam   Right arm light touch: normal  Left arm light touch: normal  Right leg light touch: normal  Left leg light touch: normal    Gait, Coordination, and Reflexes     Coordination   Finger to nose coordination: normal  Heel to shin coordination: normal      Physical Exam  Eyes:      Extraocular Movements: EOM normal.      Pupils: Pupils are equal, round, and reactive to light.   Neurological:      Coordination: Finger-Nose-Finger Test and Heel to Shin Test normal.   Psychiatric:         Speech: Speech normal.         Procedures    Assessment/Plan:    Patient has a diagnosis of epilepsy and she is seizure-free for over 10 years on the current medication regimen she is on.  I will continue this for now and monitor for side effects such as osteoporosis.  It was noted she had ataxia in 2019 though I do not see a clear ataxia on finger-nose-finger or heel-to-shin testing today.  Plan:  -seizure precautions discussed including avoiding open flames,  open bodies of water (pt can't swim), heavy machinery, and heights greater than her own height.  -[place] state law re: no driving for 3 months from last seizure  -The patient has epilepsy which is a chronic condition that poses a threat to life or serious harm  -Prescription medications are managed in this visit.  The patient should continue her zonisamide 300 mg nightly and Dilantin brand-name 330 mg/300 mg alternating at night  -Repeat DEXA scan as it has been 5 years since her last DEXA scan to evaluate for bone density  -recommended vit D-calcium supplementation to be purchased over-the-counter given her recent vitamin D being low  -Check Dilantin level today  Return in about 1 year (around 7/5/2023).       Diagnoses and all orders for this visit:    1. Partial idiopathic epilepsy with seizures of localized onset, not intractable, without status epilepticus (HCC) (Primary)  -     Phenytoin Level, Total; Future  -     DEXA Bone Density Axial; Future    2. High risk medication use  -     Phenytoin Level, Total; Future  -     DEXA Bone Density Axial; Future    3. Other specified disorders of bone density and structure, other site   -     DEXA Bone Density Axial; Future           Goldy Lowry MD

## 2022-07-11 ENCOUNTER — TELEPHONE (OUTPATIENT)
Dept: NEUROLOGY | Facility: CLINIC | Age: 67
End: 2022-07-11

## 2022-07-11 NOTE — TELEPHONE ENCOUNTER
Caller: CHARLENE    Relationship: SELF    Best call back number: [phone]    What orders are you requesting (i.e. lab or imaging): DEXA Bone Density Axial     In what timeframe would the patient need to come in: SOON    Where will you receive your lab/imaging services: PT WAS WANTING TO SEE IF THIS CAN BE COMPLETED AT [facility]   FAX: [phone]    Additional notes: EVELYN MORALES COULD NOT GET PATIENT IN UNTIL 1-6-23

## 2022-07-11 NOTE — TELEPHONE ENCOUNTER
Message was send  to Patient that she can have the test anywhere as long as she makes sure we get the Result ,ming HAMILTON

## 2022-10-19 RX ORDER — ZONISAMIDE 100 MG/1
CAPSULE ORAL
Qty: 270 CAPSULE | Refills: 0 | Status: SHIPPED | OUTPATIENT
Start: 2022-10-19 | End: 2023-01-16

## 2022-10-19 NOTE — TELEPHONE ENCOUNTER
Caller: Daylin Rodriguez    Relationship: Self    Best call back number: [phone]    What is the best time to reach you: ANY    Who are you requesting to speak with (clinical staff, provider,  specific staff member): NASIM    What was the call regarding: PATIENT IS F/U ON PREV RX REFILL REQUEST. PLEASE SEND TO [facility] ON FILE [phone]

## 2022-10-19 NOTE — TELEPHONE ENCOUNTER
Caller: Daylin Rodriguez     Relationship: Self     Best call back number: [phone]     What is the best time to reach you: ANY     Who are you requesting to speak with (clinical staff, provider,  specific staff member): NASIM     What was the call regarding: PATIENT IS F/U ON PREV RX REFILL REQUEST. PLEASE SEND TO [facility] ON FILE [phone]

## 2022-10-24 ENCOUNTER — TELEPHONE (OUTPATIENT)
Dept: NEUROLOGY | Facility: CLINIC | Age: 67
End: 2022-10-24

## 2022-10-24 RX ORDER — PHENYTOIN SODIUM 100 MG/1
CAPSULE, EXTENDED RELEASE ORAL
Qty: 270 CAPSULE | Refills: 0 | Status: SHIPPED | OUTPATIENT
Start: 2022-10-24 | End: 2022-10-27 | Stop reason: SDUPTHER

## 2022-10-24 RX ORDER — EXTENDED PHENYTOIN SODIUM 30 MG/1
30 CAPSULE ORAL DAILY
Qty: 90 CAPSULE | Refills: 0 | Status: SHIPPED | OUTPATIENT
Start: 2022-10-24 | End: 2022-10-27 | Stop reason: SDUPTHER

## 2022-10-24 NOTE — TELEPHONE ENCOUNTER
----- Message from Daylin Rodriguez sent at 10/21/2022  4:46 PM EDT -----  Regarding: Prescription renewal  Contact: [phone]  Stan Lowry,  Could you please approve the renewal of my Dilantin prescriptions?  The pharmacy informed me that only the zonisamide has been approved so far.  Thank you.  Daylin Rodriguez

## 2022-10-27 RX ORDER — PHENYTOIN SODIUM 100 MG/1
CAPSULE, EXTENDED RELEASE ORAL
Qty: 270 CAPSULE | Refills: 0 | Status: SHIPPED | OUTPATIENT
Start: 2022-10-27

## 2022-10-27 RX ORDER — EXTENDED PHENYTOIN SODIUM 30 MG/1
CAPSULE ORAL
Qty: 90 CAPSULE | Refills: 0 | Status: SHIPPED | OUTPATIENT
Start: 2022-10-27

## 2023-01-06 ENCOUNTER — APPOINTMENT (OUTPATIENT)
Dept: BONE DENSITY | Facility: HOSPITAL | Age: 68
End: 2023-01-06

## 2023-01-16 RX ORDER — ZONISAMIDE 100 MG/1
CAPSULE ORAL
Qty: 270 CAPSULE | Refills: 0 | Status: SHIPPED | OUTPATIENT
Start: 2023-01-16 | End: 2023-02-03 | Stop reason: SDUPTHER

## 2023-02-03 ENCOUNTER — TELEPHONE (OUTPATIENT)
Dept: NEUROLOGY | Facility: CLINIC | Age: 68
End: 2023-02-03
Payer: MEDICARE

## 2023-02-03 RX ORDER — ZONISAMIDE 100 MG/1
300 CAPSULE ORAL DAILY
Qty: 270 CAPSULE | Refills: 1 | Status: SHIPPED | OUTPATIENT
Start: 2023-02-03

## 2023-02-03 NOTE — TELEPHONE ENCOUNTER
Caller: Daylin Rodriguez    Relationship: Self    Best call back number: [phone]    What medications are you currently taking:   Current Outpatient Medications on File Prior to Visit   Medication Sig Dispense Refill   • Dilantin 100 MG capsule TAKE 3 CAPSULES BY MOUTH ONCE DAILY 270 capsule 0   • Dilantin 30 MG ER capsule Take 1 capsule by mouth once daily 90 capsule 0   • atorvastatin (LIPITOR) 10 MG tablet Take 10 mg by mouth Every Night.     • cetirizine (zyrTEC) 10 MG tablet Take 10 mg by mouth As Needed.     • zonisamide (ZONEGRAN) 100 MG capsule TAKE 3 CAPSULES BY MOUTH ONCE DAILY 270 capsule 0     No current facility-administered medications on file prior to visit.          When did you start taking these medications: N/A    Which medication are you concerned about: ZONISAMIDE    Who prescribed you this medication: DR. HOPPER    What are your concerns: [facility] IS OUT OF THE MEDICATION, UNKNOWN WHEN THEY WILL GET IT BACK IN THE STORE.    [id number] HAS THE MEDICATION, PLEASE SEND RX OVER.    THANK YOU

## 2023-04-28 RX ORDER — PHENYTOIN SODIUM 100 MG/1
CAPSULE, EXTENDED RELEASE ORAL
Qty: 270 CAPSULE | Refills: 0 | Status: SHIPPED | OUTPATIENT
Start: 2023-04-28

## 2023-05-01 RX ORDER — EXTENDED PHENYTOIN SODIUM 30 MG/1
30 CAPSULE ORAL DAILY
Qty: 90 CAPSULE | Refills: 0 | Status: SHIPPED | OUTPATIENT
Start: 2023-05-01

## 2023-08-03 ENCOUNTER — OFFICE VISIT (OUTPATIENT)
Dept: INTERNAL MEDICINE | Facility: CLINIC | Age: 68
End: 2023-08-03
Payer: MEDICARE

## 2023-08-03 VITALS
BODY MASS INDEX: 26.5 KG/M2 | WEIGHT: 135 LBS | SYSTOLIC BLOOD PRESSURE: 140 MMHG | OXYGEN SATURATION: 97 % | HEART RATE: 75 BPM | DIASTOLIC BLOOD PRESSURE: 70 MMHG | HEIGHT: 60 IN

## 2023-08-03 DIAGNOSIS — G89.29 CHRONIC BILATERAL LOW BACK PAIN WITH BILATERAL SCIATICA: ICD-10-CM

## 2023-08-03 DIAGNOSIS — D75.89 MACROCYTOSIS: Primary | ICD-10-CM

## 2023-08-03 DIAGNOSIS — Z12.31 ENCOUNTER FOR SCREENING MAMMOGRAM FOR MALIGNANT NEOPLASM OF BREAST: ICD-10-CM

## 2023-08-03 DIAGNOSIS — M47.816 LUMBAR SPONDYLOSIS: ICD-10-CM

## 2023-08-03 DIAGNOSIS — E55.9 VITAMIN D INSUFFICIENCY: ICD-10-CM

## 2023-08-03 DIAGNOSIS — M54.41 CHRONIC BILATERAL LOW BACK PAIN WITH BILATERAL SCIATICA: ICD-10-CM

## 2023-08-03 DIAGNOSIS — Z11.59 NEED FOR HEPATITIS C SCREENING TEST: ICD-10-CM

## 2023-08-03 DIAGNOSIS — M54.42 CHRONIC BILATERAL LOW BACK PAIN WITH BILATERAL SCIATICA: ICD-10-CM

## 2023-08-03 DIAGNOSIS — Z12.11 COLON CANCER SCREENING: ICD-10-CM

## 2023-08-03 DIAGNOSIS — E78.00 PURE HYPERCHOLESTEROLEMIA: ICD-10-CM

## 2023-08-03 PROCEDURE — 99204 OFFICE O/P NEW MOD 45 MIN: CPT | Performed by: STUDENT IN AN ORGANIZED HEALTH CARE EDUCATION/TRAINING PROGRAM

## 2023-08-03 RX ORDER — CELECOXIB 100 MG/1
100 CAPSULE ORAL 2 TIMES DAILY PRN
Qty: 180 CAPSULE | Refills: 1 | Status: SHIPPED | OUTPATIENT
Start: 2023-08-03

## 2023-08-04 LAB
25(OH)D3+25(OH)D2 SERPL-MCNC: 33.3 NG/ML (ref 30–100)
ALBUMIN SERPL-MCNC: 4.6 G/DL (ref 3.5–5.2)
ALBUMIN/GLOB SERPL: 2 G/DL
ALP SERPL-CCNC: 94 U/L (ref 39–117)
ALT SERPL-CCNC: 23 U/L (ref 1–33)
AST SERPL-CCNC: 19 U/L (ref 1–32)
BASOPHILS # BLD AUTO: 0.03 10*3/MM3 (ref 0–0.2)
BASOPHILS NFR BLD AUTO: 0.7 % (ref 0–1.5)
BILIRUB SERPL-MCNC: <0.2 MG/DL (ref 0–1.2)
BUN SERPL-MCNC: 12 MG/DL (ref 8–23)
BUN/CREAT SERPL: 15.8 (ref 7–25)
CALCIUM SERPL-MCNC: 9.8 MG/DL (ref 8.6–10.5)
CHLORIDE SERPL-SCNC: 102 MMOL/L (ref 98–107)
CHOLEST SERPL-MCNC: 229 MG/DL (ref 0–200)
CO2 SERPL-SCNC: 24.3 MMOL/L (ref 22–29)
CREAT SERPL-MCNC: 0.76 MG/DL (ref 0.57–1)
EGFRCR SERPLBLD CKD-EPI 2021: 84.4 ML/MIN/1.73
EOSINOPHIL # BLD AUTO: 0.06 10*3/MM3 (ref 0–0.4)
EOSINOPHIL NFR BLD AUTO: 1.4 % (ref 0.3–6.2)
ERYTHROCYTE [DISTWIDTH] IN BLOOD BY AUTOMATED COUNT: 13.3 % (ref 12.3–15.4)
FOLATE SERPL-MCNC: 6.87 NG/ML (ref 4.78–24.2)
GLOBULIN SER CALC-MCNC: 2.3 GM/DL
GLUCOSE SERPL-MCNC: 85 MG/DL (ref 65–99)
HCT VFR BLD AUTO: 38.8 % (ref 34–46.6)
HCV IGG SERPL QL IA: NON REACTIVE
HDLC SERPL-MCNC: 107 MG/DL (ref 40–60)
HGB BLD-MCNC: 13.5 G/DL (ref 12–15.9)
IMM GRANULOCYTES # BLD AUTO: 0.01 10*3/MM3 (ref 0–0.05)
IMM GRANULOCYTES NFR BLD AUTO: 0.2 % (ref 0–0.5)
LDLC SERPL CALC-MCNC: 107 MG/DL (ref 0–100)
LDLC/HDLC SERPL: 0.98 {RATIO}
LYMPHOCYTES # BLD AUTO: 1.91 10*3/MM3 (ref 0.7–3.1)
LYMPHOCYTES NFR BLD AUTO: 43.9 % (ref 19.6–45.3)
MCH RBC QN AUTO: 32.5 PG (ref 26.6–33)
MCHC RBC AUTO-ENTMCNC: 34.8 G/DL (ref 31.5–35.7)
MCV RBC AUTO: 93.5 FL (ref 79–97)
MONOCYTES # BLD AUTO: 0.56 10*3/MM3 (ref 0.1–0.9)
MONOCYTES NFR BLD AUTO: 12.9 % (ref 5–12)
NEUTROPHILS # BLD AUTO: 1.78 10*3/MM3 (ref 1.7–7)
NEUTROPHILS NFR BLD AUTO: 40.9 % (ref 42.7–76)
NRBC BLD AUTO-RTO: 0 /100 WBC (ref 0–0.2)
PLATELET # BLD AUTO: 206 10*3/MM3 (ref 140–450)
POTASSIUM SERPL-SCNC: 4.1 MMOL/L (ref 3.5–5.2)
PROT SERPL-MCNC: 6.9 G/DL (ref 6–8.5)
RBC # BLD AUTO: 4.15 10*6/MM3 (ref 3.77–5.28)
SODIUM SERPL-SCNC: 140 MMOL/L (ref 136–145)
TRIGL SERPL-MCNC: 87 MG/DL (ref 0–150)
TSH SERPL DL<=0.005 MIU/L-ACNC: 2.59 UIU/ML (ref 0.27–4.2)
VIT B12 SERPL-MCNC: 433 PG/ML (ref 211–946)
VLDLC SERPL CALC-MCNC: 15 MG/DL (ref 5–40)
WBC # BLD AUTO: 4.35 10*3/MM3 (ref 3.4–10.8)

## 2023-08-04 RX ORDER — ATORVASTATIN CALCIUM 20 MG/1
20 TABLET, FILM COATED ORAL NIGHTLY
Qty: 90 TABLET | Refills: 1 | Status: SHIPPED | OUTPATIENT
Start: 2023-08-04

## 2023-08-11 ENCOUNTER — TELEPHONE (OUTPATIENT)
Dept: INTERNAL MEDICINE | Facility: CLINIC | Age: 68
End: 2023-08-11
Payer: MEDICARE

## 2023-08-11 NOTE — TELEPHONE ENCOUNTER
Patient returned your call. Patient stated that she used a celbrix rx card that she got from the website. She already got the medication. But she said if you would like to call her she is home now.

## 2023-08-29 ENCOUNTER — HOSPITAL ENCOUNTER (OUTPATIENT)
Dept: MAMMOGRAPHY | Facility: HOSPITAL | Age: 68
Discharge: HOME | End: 2023-08-29
Admitting: STUDENT IN AN ORGANIZED HEALTH CARE EDUCATION/TRAINING PROGRAM
Payer: MEDICARE

## 2023-08-29 DIAGNOSIS — Z12.31 ENCOUNTER FOR SCREENING MAMMOGRAM FOR MALIGNANT NEOPLASM OF BREAST: ICD-10-CM

## 2023-08-29 PROCEDURE — 77063 BREAST TOMOSYNTHESIS BI: CPT

## 2023-08-29 PROCEDURE — 77067 SCR MAMMO BI INCL CAD: CPT

## 2023-10-04 ENCOUNTER — OFFICE VISIT (OUTPATIENT)
Dept: INTERNAL MEDICINE | Facility: CLINIC | Age: 68
End: 2023-10-04
Payer: MEDICARE

## 2023-10-04 VITALS
HEIGHT: 60 IN | SYSTOLIC BLOOD PRESSURE: 120 MMHG | HEART RATE: 87 BPM | BODY MASS INDEX: 26.11 KG/M2 | WEIGHT: 133 LBS | OXYGEN SATURATION: 96 % | DIASTOLIC BLOOD PRESSURE: 82 MMHG

## 2023-10-04 DIAGNOSIS — Z23 NEED FOR IMMUNIZATION AGAINST INFLUENZA: ICD-10-CM

## 2023-10-04 DIAGNOSIS — Z00.00 MEDICARE ANNUAL WELLNESS VISIT, SUBSEQUENT: Primary | ICD-10-CM

## 2023-10-04 DIAGNOSIS — E78.00 PURE HYPERCHOLESTEROLEMIA: ICD-10-CM

## 2023-10-04 LAB
CHOLEST SERPL-MCNC: 224 MG/DL (ref 0–200)
HDLC SERPL-MCNC: 99 MG/DL (ref 40–60)
LDLC SERPL CALC-MCNC: 112 MG/DL (ref 0–100)
LDLC/HDLC SERPL: 1.11 {RATIO}
TRIGL SERPL-MCNC: 75 MG/DL (ref 0–150)
VLDLC SERPL CALC-MCNC: 13 MG/DL (ref 5–40)

## 2023-10-04 PROCEDURE — 1170F FXNL STATUS ASSESSED: CPT | Performed by: STUDENT IN AN ORGANIZED HEALTH CARE EDUCATION/TRAINING PROGRAM

## 2023-10-04 RX ORDER — ATORVASTATIN CALCIUM 40 MG/1
40 TABLET, FILM COATED ORAL DAILY
Qty: 90 TABLET | Refills: 1 | Status: SHIPPED | OUTPATIENT
Start: 2023-10-04

## 2023-10-04 NOTE — PROGRESS NOTES
"The ABCs of the Annual Wellness Visit  Subsequent Medicare Wellness Visit    Subjective      Daylin Rodriguez is a 68 y.o. female who presents for a Subsequent Medicare Wellness Visit.    The following portions of the patient's history were reviewed and   updated as appropriate: allergies, current medications, past family history, past medical history, past social history, past surgical history, and problem list.    Epilepsy: follows with [facility]. Currently taking zonisamide and brand-name Dilantin. Has been seizure free since 2011.     Mild-to-moderate degenerative spondylosis lumbar: States she has back trouble for \"over 20 years\". Has seen physical therapy. Had MRI 2022. States physical therapy did help her with balance. She has been to a pain management and had one epidural steroid injection which helped but the second didn't help.   States she doesn't want to take opioids. 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 At last visit added celebrex 100 mg twice daily. She is also taking Tylenol arthritis as needed. States Tylenol and Celebrex does help some. She has seen pain management again since last visit who is going to do an ablation soon.     Allergies:  takes cetirizine 10 mg daily as needed     Hyperlipidemia: at last visit recommended increased atorvastatin from 10 mg daily to 20 mg daily       Compared to one year ago, the patient feels her physical   health is the same.    Compared to one year ago, the patient feels her mental   health is the same.    Recent Hospitalizations:  She was not admitted to the hospital during the last year.       Current Medical Providers:  Patient Care Team:  Sam Clement DO as PCP - General (Internal Medicine)    Outpatient Medications Prior to Visit   Medication Sig Dispense Refill    atorvastatin (LIPITOR) 20 MG tablet Take 1 tablet by mouth Every Night. 90 tablet 1    celecoxib (CeleBREX) " "100 MG capsule Take 1 capsule by mouth 2 (Two) Times a Day As Needed for Moderate Pain. 180 capsule 1    cetirizine (zyrTEC) 10 MG tablet Take 1 tablet by mouth As Needed.      Dilantin 100 MG capsule Take 3 capsules by mouth Daily. 270 capsule 3    Dilantin 30 MG ER capsule Take 1 capsule by mouth Every Other Day. 45 capsule 3    zonisamide (ZONEGRAN) 100 MG capsule Take 3 capsules by mouth Daily. 270 capsule 3     No facility-administered medications prior to visit.       No opioid medication identified on active medication list. I have reviewed chart for other potential  high risk medication/s and harmful drug interactions in the elderly.        Aspirin is not on active medication list.  Aspirin use is not indicated based on review of current medical condition/s. Risk of harm outweighs potential benefits.  .    Patient Active Problem List   Diagnosis    Partial idiopathic epilepsy with seizures of localized onset, not intractable, without status epilepticus    Adverse drug effect, subsequent encounter    Ataxia on examination    Osteopenia determined by x-ray     Advance Care Planning   Advance Care Planning     Advance Directive is not on file.  ACP discussion was held with the patient during this visit. Patient does not have an advance directive, information provided.     Objective    Vitals:    10/04/23 0849   BP: 120/82   Pulse: 87   SpO2: 96%   Weight: 60.3 kg (133 lb)   Height: 152.4 cm (60\")     Physical Exam  Vitals reviewed.   Constitutional:       General: She is not in acute distress.     Appearance: Normal appearance. She is not ill-appearing.   HENT:      Head: Atraumatic.   Eyes:      General: No scleral icterus.  Cardiovascular:      Rate and Rhythm: Normal rate and regular rhythm.      Heart sounds: Normal heart sounds. No murmur heard.  Pulmonary:      Effort: Pulmonary effort is normal. No respiratory distress.      Breath sounds: Normal breath sounds. No stridor. No wheezing or rhonchi. " "  Abdominal:      General: Bowel sounds are normal. There is no distension.      Palpations: Abdomen is soft.      Tenderness: There is no abdominal tenderness. There is no guarding.   Musculoskeletal:      Right lower leg: No edema.      Left lower leg: No edema.   Skin:     Coloration: Skin is not jaundiced.   Neurological:      Mental Status: She is alert.   Psychiatric:         Mood and Affect: Mood normal.         Behavior: Behavior normal.         Thought Content: Thought content normal.         Estimated body mass index is 25.97 kg/m² as calculated from the following:    Height as of this encounter: 152.4 cm (60\").    Weight as of this encounter: 60.3 kg (133 lb).    BMI is >= 25 and <30. (Overweight) The following options were offered after discussion;: exercise counseling/recommendations      Does the patient have evidence of cognitive impairment?   No    Lab Results   Component Value Date    CHLPL 229 (H) 08/03/2023    TRIG 87 08/03/2023     (H) 08/03/2023     (H) 08/03/2023    VLDL 15 08/03/2023          HEALTH RISK ASSESSMENT    Smoking Status:  Social History     Tobacco Use   Smoking Status Never   Smokeless Tobacco Never     Alcohol Consumption:  Social History     Substance and Sexual Activity   Alcohol Use Never     Fall Risk Screen:    STEADI Fall Risk Assessment was completed, and patient is at LOW risk for falls.Assessment completed on:10/4/2023    Depression Screening:      10/4/2023     8:55 AM   PHQ-2/PHQ-9 Depression Screening   Little Interest or Pleasure in Doing Things 0-->not at all   Feeling Down, Depressed or Hopeless 0-->not at all   PHQ-9: Brief Depression Severity Measure Score 0       Health Habits and Functional and Cognitive Screening:      10/4/2023     8:53 AM   Functional & Cognitive Status   Do you have difficulty preparing food and eating? No   Do you have difficulty bathing yourself, getting dressed or grooming yourself? No   Do you have difficulty using the " toilet? No   Do you have difficulty moving around from place to place? No   Do you have trouble with steps or getting out of a bed or a chair? No   Current Diet Limited Junk Food   Dental Exam Up to date   Eye Exam Up to date   Exercise (times per week) 1 times per week   Current Exercises Include Walking   Do you need help using the phone?  No   Are you deaf or do you have serious difficulty hearing?  No   Do you need help to go to places out of walking distance? No   Do you need help shopping? No   Do you need help preparing meals?  No   Do you need help with housework?  No   Do you need help with laundry? No   Do you need help taking your medications? No   Do you need help managing money? No   Do you ever drive or ride in a car without wearing a seat belt? No   Have you felt unusual stress, anger or loneliness in the last month? No   Who do you live with? Alone   If you need help, do you have trouble finding someone available to you? No   Do you have difficulty concentrating, remembering or making decisions? No       Age-appropriate Screening Schedule:  Refer to the list below for future screening recommendations based on patient's age, sex and/or medical conditions. Orders for these recommended tests are listed in the plan section. The patient has been provided with a written plan.    Health Maintenance   Topic Date Due    BMI FOLLOWUP  Never done    INFLUENZA VACCINE  08/01/2023    COVID-19 Vaccine (5 - 2023-24 season) 09/01/2023    LIPID PANEL  08/03/2024    ANNUAL WELLNESS VISIT  10/04/2024    DXA SCAN  10/21/2024    MAMMOGRAM  08/29/2025    COLORECTAL CANCER SCREENING  08/28/2026    TDAP/TD VACCINES (2 - Td or Tdap) 09/28/2029    HEPATITIS C SCREENING  Completed    Pneumococcal Vaccine 65+  Completed    ZOSTER VACCINE  Completed                  CMS Preventative Services Quick Reference  Risk Factors Identified During Encounter:    Chronic Pain:  she follows with pain management  Immunizations  Discussed/Encouraged: Influenza and COVID19  Inactivity/Sedentary: Patient was advised to exercise at least 150 minutes a week per CDC recommendations.    BMI is >= 25 and <30. (Overweight) The following options were offered after discussion;: exercise counseling/recommendations      The above risks/problems have been discussed with the patient.  Pertinent information has been shared with the patient in the After Visit Summary.      Follow Up:   Next Medicare Wellness visit to be scheduled in 1 year.      An After Visit Summary and PPPS were made available to the patient.      A problem-based visit was also conducted on the same day, see below for assessment and plan    Diagnoses and all orders for this visit:    1. Pure hypercholesterolemia (Primary)  Lab Results   Component Value Date    CHLPL 229 (H) 08/03/2023    TRIG 87 08/03/2023     (H) 08/03/2023     (H) 08/03/2023   -chronic uncontrolled   -at last visit recommended she increase atorvastatin 10 mg daily to 20 mg daily   -indication is primary prevention  -she does take Dilantin which reduces atorvastatin effectiveness  -recheck fasting lipid today, adjust regimen further as needed         The following social determinates of health impact the patient's medical decision making: No social determinates of health were factored in to today's visit.     Follow Up  Return in about 6 months (around 4/4/2024).

## 2024-01-22 DIAGNOSIS — M54.41 CHRONIC BILATERAL LOW BACK PAIN WITH BILATERAL SCIATICA: ICD-10-CM

## 2024-01-22 DIAGNOSIS — M54.42 CHRONIC BILATERAL LOW BACK PAIN WITH BILATERAL SCIATICA: ICD-10-CM

## 2024-01-22 DIAGNOSIS — M47.816 LUMBAR SPONDYLOSIS: ICD-10-CM

## 2024-01-22 DIAGNOSIS — G89.29 CHRONIC BILATERAL LOW BACK PAIN WITH BILATERAL SCIATICA: ICD-10-CM

## 2024-01-25 RX ORDER — CELECOXIB 100 MG/1
CAPSULE ORAL
Qty: 180 CAPSULE | Refills: 1 | Status: SHIPPED | OUTPATIENT
Start: 2024-01-25

## 2024-03-27 DIAGNOSIS — E78.00 PURE HYPERCHOLESTEROLEMIA: ICD-10-CM

## 2024-03-28 RX ORDER — ATORVASTATIN CALCIUM 40 MG/1
40 TABLET, FILM COATED ORAL DAILY
Qty: 90 TABLET | Refills: 1 | Status: SHIPPED | OUTPATIENT
Start: 2024-03-28

## 2024-04-04 ENCOUNTER — OFFICE VISIT (OUTPATIENT)
Dept: INTERNAL MEDICINE | Facility: CLINIC | Age: 69
End: 2024-04-04
Payer: MEDICARE

## 2024-04-04 VITALS
DIASTOLIC BLOOD PRESSURE: 80 MMHG | BODY MASS INDEX: 25.13 KG/M2 | TEMPERATURE: 98.4 F | HEIGHT: 60 IN | OXYGEN SATURATION: 97 % | SYSTOLIC BLOOD PRESSURE: 120 MMHG | WEIGHT: 128 LBS | RESPIRATION RATE: 12 BRPM | HEART RATE: 73 BPM

## 2024-04-04 DIAGNOSIS — E78.00 PURE HYPERCHOLESTEROLEMIA: Primary | ICD-10-CM

## 2024-04-04 DIAGNOSIS — Z91.09 ENVIRONMENTAL ALLERGIES: ICD-10-CM

## 2024-04-04 NOTE — PROGRESS NOTES
"  Sam Clement D.O.  Internal Medicine  [facility]  [address], [address]  [place] [address]  [phone]      Chief Complaint  Follow-up (6 Mo. F/u)    SUBJECTIVE    History of Present Illness    Daylin Rodriguez is a 69 y.o. female who presents to the office today as an established patient that last saw me on 10/4/2023.     Hyperlipidemia: at last visit recommended increased atorvastatin from 20 mg daily to 40 mg daily. For primary prevention. She doesn't believe she has had any side effects with this medication.     Allergies: states she uses cetirizine as needed but still deals with dry and itchy eyes and she is wondering if I have any recommendations for eye drops.     Allergies   Allergen Reactions    No Known Drug Allergy         Outpatient Medications Marked as Taking for the 4/4/24 encounter (Office Visit) with Sam Clement, DO   Medication Sig Dispense Refill    atorvastatin (LIPITOR) 40 MG tablet TAKE 1 TABLET BY MOUTH DAILY 90 tablet 1    celecoxib (CeleBREX) 100 MG capsule TAKE 1 CAPSULE BY MOUTH TWICE DAILY AS NEEDED FOR MODERATE PAIN 180 capsule 1    cetirizine (zyrTEC) 10 MG tablet Take 1 tablet by mouth As Needed.      Dilantin 100 MG capsule Take 3 capsules by mouth Daily. 270 capsule 3    Dilantin 30 MG ER capsule Take 1 capsule by mouth Every Other Day. 45 capsule 3    zonisamide (ZONEGRAN) 100 MG capsule Take 3 capsules by mouth Daily. 270 capsule 3        Past Medical History:   Diagnosis Date    Allergies     Hyperlipidemia 2020    Lumbar spondylosis     Seizures     Vision loss 2000    wear glasses       OBJECTIVE    Vital Signs:   /80 (BP Location: Left arm, Patient Position: Sitting, Cuff Size: Adult)   Pulse 73   Temp 98.4 °F (36.9 °C) (Oral)   Resp 12   Ht 152.4 cm (60\")   Wt 58.1 kg (128 lb)   SpO2 97%   BMI 25.00 kg/m²        Physical Exam  Vitals reviewed.   Constitutional:       General: She is not in acute distress.     Appearance: Normal " appearance. She is normal weight. She is not ill-appearing.   HENT:      Right Ear: Tympanic membrane, ear canal and external ear normal. There is no impacted cerumen.      Left Ear: Tympanic membrane, ear canal and external ear normal. There is no impacted cerumen.   Eyes:      General: No scleral icterus.  Pulmonary:      Effort: Pulmonary effort is normal.   Skin:     Coloration: Skin is not jaundiced.   Neurological:      Mental Status: She is alert.   Psychiatric:         Mood and Affect: Mood normal.         Behavior: Behavior normal.         Thought Content: Thought content normal.                             ASSESSMENT & PLAN     Diagnoses and all orders for this visit:    1. Pure hypercholesterolemia (Primary)  Lab Results   Component Value Date    CHLPL 224 (H) 10/04/2023    TRIG 75 10/04/2023    HDL 99 (H) 10/04/2023     (H) 10/04/2023     - at last visit recommended increased atorvastatin from 20 mg daily to 40 mg daily. For primary prevention. She doesn't believe she has had any side effects with this medication.   -recheck lipid profile fasting today to assess for improvement, adjust regimen further if needed  -     Lipid Panel    2. Environmental allergies  - states she uses cetirizine as needed but still deals with dry and itchy eyes and she is wondering if I have any recommendations for eye drops.   -recommend she try Pataday eye allergy drops OTC           Follow Up  No follow-ups on file.    Patient/family had no further questions at this time and verbalized understanding of the plan discussed today.

## 2024-04-05 LAB
CHOLEST SERPL-MCNC: 211 MG/DL (ref 0–200)
HDLC SERPL-MCNC: 102 MG/DL (ref 40–60)
LDLC SERPL CALC-MCNC: 95 MG/DL (ref 0–100)
TRIGL SERPL-MCNC: 79 MG/DL (ref 0–150)
VLDLC SERPL CALC-MCNC: 14 MG/DL (ref 5–40)

## 2024-05-22 NOTE — PROGRESS NOTES
Patient ID: Daylin Rodriguez is a 69 y.o. female is being seen for consultation today at the request of MARIA G Castorena for a second opinion lumbar radiculopathy.    Imaging: Last MRI of the lumbar spine performed on 03/01/2024    Subjective     The patient is here in regards to   Chief Complaint   Patient presents with    Back Pain       History of Present Illness  Very has been dealing with low back pain worse on the left than the right with occasional pain down the back of her leg and buttocks for approximately 4 years.  She has had 2 epidural steroid injections in that time and neither have been very helpful for her.  She is completing a significant amount of physical therapy which has not been effective either.  She has difficulty standing or sitting for long periods of time and it is affecting her ability to ambulate normally      While in the room and during my examination of the patient I wore a mask and eye protection.  I washed my hands before and after this patient encounter.  The patient was also wearing a mask.    The following portions of the patient's history were reviewed and updated as appropriate: allergies, current medications, past family history, past medical history, past social history, past surgical history and problem list.    Review of Systems   Constitutional:  Negative for fever.   HENT:  Negative for congestion and tinnitus.    Eyes:  Negative for visual disturbance.   Respiratory:  Negative for chest tightness.    Cardiovascular:  Negative for chest pain.   Gastrointestinal:  Negative for diarrhea, nausea and vomiting.   Endocrine: Negative for cold intolerance and heat intolerance.   Genitourinary:  Negative for difficulty urinating.   Musculoskeletal:  Positive for back pain, gait problem and neck stiffness. Negative for neck pain.   Skin:  Negative for rash.   Allergic/Immunologic: Negative for food allergies.   Neurological:  Positive for weakness, light-headedness and  numbness (bilateral lower extremities). Negative for dizziness and headaches.   Hematological:  Does not bruise/bleed easily.   Psychiatric/Behavioral:  Negative for confusion and decreased concentration.         Past Medical History:   Diagnosis Date    Allergies     Hyperlipidemia 2020    Lumbar spondylosis     Seizures     Vision loss 2000    wear glasses       Allergies   Allergen Reactions    No Known Drug Allergy        Family History   Problem Relation Age of Onset    Liver cancer Mother     Stroke Father     Other Father         stomach ulcers, hemorrhoids    Atrial fibrillation Brother     Diabetes Brother        Social History     Socioeconomic History    Marital status: Single   Tobacco Use    Smoking status: Never    Smokeless tobacco: Never   Vaping Use    Vaping status: Never Used   Substance and Sexual Activity    Alcohol use: Never    Drug use: Never       Past Surgical History:   Procedure Laterality Date    NO PAST SURGERIES           Objective     Vitals:    05/24/24 1112   BP: 112/76   Pulse: 76   Resp: 16   Temp: 97.1 °F (36.2 °C)   SpO2: 98%     Body mass index is 25.12 kg/m².    Physical Exam  Constitutional:       Appearance: Normal appearance.   HENT:      Head: Normocephalic and atraumatic.   Eyes:      Extraocular Movements: Extraocular movements intact.      Conjunctiva/sclera: Conjunctivae normal.      Pupils: Pupils are equal, round, and reactive to light.   Cardiovascular:      Rate and Rhythm: Normal rate and regular rhythm.      Pulses: Normal pulses.   Pulmonary:      Breath sounds: Normal breath sounds.   Abdominal:      Palpations: Abdomen is soft.   Musculoskeletal:         General: Normal range of motion.      Cervical back: Normal range of motion and neck supple.      Comments: Bilateral SI joint inflammation characterized by:    -Pelvic Distraction test   -Lateral Iliac compression test   -FABERS (Amor's test)   -Ganslen's Test     Skin:     General: Skin is warm and dry.    Neurological:      Mental Status: She is alert and oriented to person, place, and time.      Cranial Nerves: Cranial nerves 2-12 are intact.      Motor: Motor function is intact. No weakness or atrophy.      Coordination: Coordination is intact. Romberg sign negative. Romberg Test normal.      Gait: Gait is intact. Gait normal.      Deep Tendon Reflexes: Reflexes are normal and symmetric.      Reflex Scores:       Tricep reflexes are 2+ on the right side and 2+ on the left side.       Bicep reflexes are 2+ on the right side and 2+ on the left side.       Brachioradialis reflexes are 2+ on the right side and 2+ on the left side.       Patellar reflexes are 2+ on the right side and 2+ on the left side.       Achilles reflexes are 2+ on the right side and 2+ on the left side.  Psychiatric:         Speech: Speech normal.         Neurologic Exam     Mental Status   Oriented to person, place, and time.   Attention: normal. Concentration: normal.   Speech: speech is normal   Level of consciousness: alert    Cranial Nerves   Cranial nerves II through XII intact.     CN III, IV, VI   Pupils are equal, round, and reactive to light.    Motor Exam   Muscle bulk: normal  Overall muscle tone: normal    Strength   Strength 5/5 except as noted.     Sensory Exam   Light touch normal.     Gait, Coordination, and Reflexes     Gait  Gait: normal    Coordination   Romberg: negative    Reflexes   Reflexes 2+ except as noted.   Right brachioradialis: 2+  Left brachioradialis: 2+  Right biceps: 2+  Left biceps: 2+  Right triceps: 2+  Left triceps: 2+  Right patellar: 2+  Left patellar: 2+  Right achilles: 2+  Left achilles: 2+      Assessment & Plan   Independent Review of Radiographic Studies:      I personally reviewed the images from the following studies.    MR: MRI of the lumbar spine wo contrast was reviewed and shows degenerative disc disease at L3-4, L4-5, L5-S1 with foraminal stenosis that is mild at L4-5 and moderate at  L5-S1    Assessment/Plan: Overall very good-looking spine for her age.  She has signs and symptoms of SI joint inflammation and I think she will benefit from SI joint injections including ice baths and anti-inflammatory therapy.    Medical Decision Making:      SI joint injection, follow-up in 3 months         Diagnoses and all orders for this visit:    1. SI (sacroiliac) joint inflammation (Primary)  -     SI Joint Injection    2. Lumbar degenerative disc disease             Patient Instructions/Recommendations:    After your SI joint injection:  -Please schedule your second SI joint injection at the time of your first SI joint injection for 2 weeks later  -Alternate 1 tablet of extra strength Tylenol and 2 tablets of Aleve (or any other NSAID) in a scheduled manner every 4 hours for at least 2 weeks after the injection  -Take an ice bath by submerging the buttocks area in 50 degree water for 30 minutes/day for 5 days consecutively after your injection.   -Try adding ice gradually to decrease the shock of the ice bath   -Try putting a robe in the dryer for 35 minutes so that the patient can have a warm robe to wear after the ice bath    -Continue to use ice packs as much as tolerable when you are not taking an ice bath        Austin Jones MD  05/24/24  12:13 EDT

## 2024-05-24 ENCOUNTER — OFFICE VISIT (OUTPATIENT)
Dept: NEUROSURGERY | Facility: CLINIC | Age: 69
End: 2024-05-24
Payer: MEDICARE

## 2024-05-24 VITALS
TEMPERATURE: 97.1 F | HEART RATE: 76 BPM | BODY MASS INDEX: 25.25 KG/M2 | HEIGHT: 60 IN | DIASTOLIC BLOOD PRESSURE: 76 MMHG | WEIGHT: 128.6 LBS | OXYGEN SATURATION: 98 % | RESPIRATION RATE: 16 BRPM | SYSTOLIC BLOOD PRESSURE: 112 MMHG

## 2024-05-24 DIAGNOSIS — M51.36 LUMBAR DEGENERATIVE DISC DISEASE: ICD-10-CM

## 2024-05-24 DIAGNOSIS — M46.1 SI (SACROILIAC) JOINT INFLAMMATION: Primary | ICD-10-CM

## 2024-05-24 PROBLEM — M51.369 LUMBAR DEGENERATIVE DISC DISEASE: Status: ACTIVE | Noted: 2024-05-24

## 2024-06-06 ENCOUNTER — TELEPHONE (OUTPATIENT)
Dept: NEUROLOGY | Facility: CLINIC | Age: 69
End: 2024-06-06

## 2024-06-06 ENCOUNTER — OFFICE VISIT (OUTPATIENT)
Dept: NEUROLOGY | Facility: CLINIC | Age: 69
End: 2024-06-06
Payer: MEDICARE

## 2024-06-06 ENCOUNTER — LAB (OUTPATIENT)
Dept: LAB | Facility: HOSPITAL | Age: 69
End: 2024-06-06
Payer: MEDICARE

## 2024-06-06 VITALS
DIASTOLIC BLOOD PRESSURE: 78 MMHG | HEIGHT: 60 IN | SYSTOLIC BLOOD PRESSURE: 130 MMHG | BODY MASS INDEX: 25.13 KG/M2 | OXYGEN SATURATION: 96 % | WEIGHT: 128 LBS

## 2024-06-06 DIAGNOSIS — G40.009 PARTIAL IDIOPATHIC EPILEPSY WITH SEIZURES OF LOCALIZED ONSET, NOT INTRACTABLE, WITHOUT STATUS EPILEPTICUS: ICD-10-CM

## 2024-06-06 DIAGNOSIS — G40.009 PARTIAL IDIOPATHIC EPILEPSY WITH SEIZURES OF LOCALIZED ONSET, NOT INTRACTABLE, WITHOUT STATUS EPILEPTICUS: Primary | ICD-10-CM

## 2024-06-06 LAB — PHENYTOIN SERPL-MCNC: 24 MCG/ML (ref 10–20)

## 2024-06-06 PROCEDURE — 36415 COLL VENOUS BLD VENIPUNCTURE: CPT

## 2024-06-06 PROCEDURE — 80185 ASSAY OF PHENYTOIN TOTAL: CPT

## 2024-06-06 PROCEDURE — 80203 DRUG SCREEN QUANT ZONISAMIDE: CPT

## 2024-06-06 PROCEDURE — 99214 OFFICE O/P EST MOD 30 MIN: CPT | Performed by: STUDENT IN AN ORGANIZED HEALTH CARE EDUCATION/TRAINING PROGRAM

## 2024-06-06 RX ORDER — ZONISAMIDE 100 MG/1
300 CAPSULE ORAL DAILY
Qty: 270 CAPSULE | Refills: 3 | Status: SHIPPED | OUTPATIENT
Start: 2024-06-06

## 2024-06-06 RX ORDER — EXTENDED PHENYTOIN SODIUM 30 MG/1
30 CAPSULE ORAL EVERY OTHER DAY
Qty: 45 CAPSULE | Refills: 3 | Status: SHIPPED | OUTPATIENT
Start: 2024-06-06

## 2024-06-06 RX ORDER — PHENYTOIN SODIUM 100 MG/1
300 CAPSULE, EXTENDED RELEASE ORAL DAILY
Qty: 270 CAPSULE | Refills: 3 | Status: SHIPPED | OUTPATIENT
Start: 2024-06-06

## 2024-06-06 NOTE — PROGRESS NOTES
Chief Complaint   Patient presents with    Seizures       Patient ID: Daylin Rodriguez is a 69 y.o. female.    HPI:    The following portions of the patient's history were reviewed and updated as appropriate: allergies, current medications, past family history, past medical history, past social history, past surgical history and problem list.    Interval history:    Review of Systems   Neurological:  Negative for dizziness, tremors, seizures, syncope, facial asymmetry, speech difficulty, weakness, light-headedness, numbness and headaches.       Ms. Rollins is a 69-year-old right-handed female with a history of epilepsy.  She presents to the neurology clinic as an established patient for follow-up of seizures.   He has had 2 large seizures in 1997 in 2001 as well as smaller seizures with spacing out lasting 1 to 2 minutes.  She has remained seizure-free since 2011.  She takes Dilantin extended release 300 mg alternating with 330 mg.  She also takes zonisamide 300 mg at night.  Her vitamin D was checked in August 2021 and was low.  She had a CBC which was notable for a leukopenia at 4.07, her CMP was checked around this time as well and was reassuring.  She had a phenytoin level of 28.1 when checked a year ago with a normal free level. No side effects. No seizures since last appointment. The medication is affordable to her. Last DEXA within 2 years ago and normal. Takes brand name Dilantin.      Has not tried other medications.    MRI/EEG - done years ago but they did not find anything abnormal on MRI. Unclear what EEG stated.    Vitals:    06/06/24 1034   BP: 130/78   SpO2: 96%       Neurologic Exam    Physical Exam    Procedures    Assessment/Plan:    Patient has a diagnosis of epilepsy and she is seizure-free for over 10 years on the current medication regimen she is on.  I will continue this for now and monitor for side effects such as osteoporosis. Likely order DEXA scan at next appt if not done prior to next  appt.    Plan:  -seizure precautions discussed including avoiding open flames, open bodies of water (pt can't swim), heavy machinery, and heights greater than her own height.  -[place] state law re: no driving for 3 months from last seizure  -The patient has epilepsy which is a chronic condition that poses a threat to life or serious harm  -Prescription medications are managed in this visit.  The patient should continue her zonisamide 300 mg nightly and Dilantin brand-name 330 mg/300 mg alternating at night     Diagnoses and all orders for this visit:    1. Partial idiopathic epilepsy with seizures of localized onset, not intractable, without status epilepticus (Primary)  -     Phenytoin Level, Total; Future  -     Zonisamide Level; Future    Other orders  -     zonisamide (ZONEGRAN) 100 MG capsule; Take 3 capsules by mouth Daily.  Dispense: 270 capsule; Refill: 3  -     Dilantin 100 MG capsule; Take 3 capsules by mouth Daily.  Dispense: 270 capsule; Refill: 3  -     Dilantin 30 MG ER capsule; Take 1 capsule by mouth Every Other Day.  Dispense: 45 capsule; Refill: 3           Goldy Lowry MD

## 2024-06-06 NOTE — TELEPHONE ENCOUNTER
Lab calling to report a critical result of pt's Phenytoin Level @ 24,0.  Will fax report to our office.

## 2024-06-10 LAB — ZONISAMIDE SERPL-MCNC: 14 UG/ML (ref 10–40)

## 2024-07-03 ENCOUNTER — TRANSCRIBE ORDERS (OUTPATIENT)
Dept: ADMINISTRATIVE | Facility: HOSPITAL | Age: 69
End: 2024-07-03
Payer: MEDICARE

## 2024-07-03 DIAGNOSIS — Z12.31 BREAST CANCER SCREENING BY MAMMOGRAM: Primary | ICD-10-CM

## 2024-07-22 DIAGNOSIS — M47.816 LUMBAR SPONDYLOSIS: ICD-10-CM

## 2024-07-22 DIAGNOSIS — M54.41 CHRONIC BILATERAL LOW BACK PAIN WITH BILATERAL SCIATICA: ICD-10-CM

## 2024-07-22 DIAGNOSIS — G89.29 CHRONIC BILATERAL LOW BACK PAIN WITH BILATERAL SCIATICA: ICD-10-CM

## 2024-07-22 DIAGNOSIS — M54.42 CHRONIC BILATERAL LOW BACK PAIN WITH BILATERAL SCIATICA: ICD-10-CM

## 2024-07-22 RX ORDER — CELECOXIB 100 MG/1
100 CAPSULE ORAL 2 TIMES DAILY
Qty: 180 CAPSULE | Refills: 1 | Status: SHIPPED | OUTPATIENT
Start: 2024-07-22

## 2024-08-20 NOTE — PROGRESS NOTES
"Subjective   Patient ID: Daylin Rodriguez is a 69 y.o. female is here today for follow-up for SI (sacroiliac) joint inflammation.     History of Present Illness    She was last seen in the office by Dr. Jones in May 2024 with signs and symptoms of SI joint inflammation.  She was referred to pain management for SI joint injections and follows up in the office today.    The patient underwent 1 injection as her insurance would not cover both sides.  She states that the injection did not help and she is not interested in trying any more.  She does continue to have the bilateral low back pain, left greater than right, as well as left leg pain that radiates all the way down the left leg to the left lateral foot.  She also reports a numbness on the top of the left foot.  She has not tried physical therapy in a couple of years.  She does take Celebrex twice daily.    She states that since undergoing the first epidural injection back in April she has had a heaviness in both legs that is made it difficult to do basic things.  As a result, she feels that she is lost a lot of her stamina and energy.  She overall feels a bit weaker.  She denies any specific balance or gait issues.  She denies any falls.  She has not tried gabapentin and discussed this medication with her neurologist.  She does take Dilantin for history of seizures.    She presents unaccompanied.    The following portions of the patient's history were reviewed and updated as appropriate: allergies, current medications, past family history, past medical history, past social history, past surgical history, and problem list.    Review of Systems   Musculoskeletal:  Positive for back pain (lower left back side) and gait problem.   Neurological:  Positive for numbness (in both legs at times).   All other systems reviewed and are negative.      /70   Pulse 73   Temp 97.1 °F (36.2 °C)   Ht 152 cm (59.84\")   Wt 57.6 kg (127 lb)   SpO2 99%   BMI 24.93 kg/m² " "      Objective     Vitals:    08/26/24 1014   BP: 110/70   Pulse: 73   Temp: 97.1 °F (36.2 °C)   SpO2: 99%   Weight: 57.6 kg (127 lb)   Height: 152 cm (59.84\")     Body mass index is 24.93 kg/m².      Physical Exam  Vitals reviewed.   Constitutional:       Appearance: Normal appearance.   HENT:      Head: Atraumatic.   Pulmonary:      Effort: Pulmonary effort is normal. No respiratory distress.   Musculoskeletal:         General: Tenderness present. Normal range of motion.      Right lower leg: No edema.      Left lower leg: No edema.      Comments: Strength is equal and intact bilateral lower extremities with normal muscle bulk and tone  Full lumbar range of motion without pain  Very tender in the bilateral SI joints, left greater than right  Diffusely tender throughout the bilateral low back and buttock, left greater than right   Skin:     General: Skin is warm and dry.   Neurological:      Mental Status: She is alert and oriented to person, place, and time.      GCS: GCS eye subscore is 4. GCS verbal subscore is 5. GCS motor subscore is 6.      Sensory: No sensory deficit.      Motor: No weakness or tremor.      Gait: Gait normal.      Deep Tendon Reflexes:      Reflex Scores:       Patellar reflexes are 2+ on the right side and 2+ on the left side.       Achilles reflexes are 2+ on the right side and 2+ on the left side.     Comments: Gait is stable and upright, nonantalgic  Normal sensation to soft touch bilateral lower extremities negative straight leg raise     Psychiatric:         Mood and Affect: Mood normal.       Neurologic Exam     Mental Status   Oriented to person, place, and time.     Gait, Coordination, and Reflexes     Reflexes   Right patellar: 2+  Left patellar: 2+  Right achilles: 2+  Left achilles: 2+          Assessment & Plan   Independent Review of Radiographic Studies:      I personally reviewed the images from the following studies.    MRI imaging showed degenerative disc disease at L3-4, " L4-5 L5-S1 with foraminal stenosis that is mild at L4-5 and moderate at L5-S1.    Medical Decision Making:      Patient got no relief after getting the SI joint injection.  She does continue to have bilateral low back pain left greater than right and radiating left leg pain.  She is not interested in doing any more injections.  Have referred her to physical therapy due to the fact that she feels that she is lost lots of stamina over the past couple of months.  She is worried about losing strength in her legs.  She is unable to walk around a grocery store without having to sit.  She is intact on exam with normal strength, sensation and reflexes.  I do think that she is deconditioned and I encouraged her to walk more.  I have also started her on a muscle relaxer in hopes that this will help with some of the low back discomfort.  We can discuss gabapentin in the future.  However, I am hesitant to start her on the medication at this point due to the fact that she is taking high doses of Dilantin for seizures.  She states that she was told by her neurologist that she is fine to take gabapentin, if needed.  She would like to hold off on starting it now.  We will try other conservative measures in the meantime and have her follow-up in 2 months.  She will call with any questions or concerns in the interim.    Plan: Start medications as directed, referral to PT, return to office in 2 months     Diagnoses and all orders for this visit:    1. Lumbar degenerative disc disease (Primary)  -     Ambulatory Referral to Physical Therapy for Evaluation & Treatment    2. SI (sacroiliac) joint inflammation  -     Ambulatory Referral to Physical Therapy for Evaluation & Treatment    3. Left leg pain  -     Ambulatory Referral to Physical Therapy for Evaluation & Treatment    Other orders  -     cyclobenzaprine (FLEXERIL) 10 MG tablet; Take 1 tablet by mouth 3 (Three) Times a Day As Needed for Muscle Spasms.  Dispense: 60 tablet;  Refill: 2      Return in about 2 months (around 10/26/2024).

## 2024-08-23 ENCOUNTER — TELEPHONE (OUTPATIENT)
Dept: NEUROSURGERY | Facility: CLINIC | Age: 69
End: 2024-08-23
Payer: MEDICARE

## 2024-08-23 NOTE — TELEPHONE ENCOUNTER
Called and left vm to confirm pt went there for SI injections.  Asked if they could fax over any prog notes.      Hub okay to read.  Thank you.

## 2024-08-24 DIAGNOSIS — E78.00 PURE HYPERCHOLESTEROLEMIA: ICD-10-CM

## 2024-08-26 ENCOUNTER — OFFICE VISIT (OUTPATIENT)
Dept: NEUROSURGERY | Facility: CLINIC | Age: 69
End: 2024-08-26
Payer: MEDICARE

## 2024-08-26 VITALS
HEART RATE: 73 BPM | OXYGEN SATURATION: 99 % | DIASTOLIC BLOOD PRESSURE: 70 MMHG | BODY MASS INDEX: 24.94 KG/M2 | TEMPERATURE: 97.1 F | WEIGHT: 127 LBS | HEIGHT: 60 IN | SYSTOLIC BLOOD PRESSURE: 110 MMHG

## 2024-08-26 DIAGNOSIS — M46.1 SI (SACROILIAC) JOINT INFLAMMATION: ICD-10-CM

## 2024-08-26 DIAGNOSIS — M79.605 LEFT LEG PAIN: ICD-10-CM

## 2024-08-26 DIAGNOSIS — M51.36 LUMBAR DEGENERATIVE DISC DISEASE: Primary | ICD-10-CM

## 2024-08-26 PROCEDURE — 99214 OFFICE O/P EST MOD 30 MIN: CPT | Performed by: NURSE PRACTITIONER

## 2024-08-26 PROCEDURE — 1159F MED LIST DOCD IN RCRD: CPT | Performed by: NURSE PRACTITIONER

## 2024-08-26 PROCEDURE — 1160F RVW MEDS BY RX/DR IN RCRD: CPT | Performed by: NURSE PRACTITIONER

## 2024-08-26 RX ORDER — CYCLOBENZAPRINE HCL 10 MG
10 TABLET ORAL 3 TIMES DAILY PRN
Qty: 60 TABLET | Refills: 2 | Status: SHIPPED | OUTPATIENT
Start: 2024-08-26

## 2024-08-27 RX ORDER — ATORVASTATIN CALCIUM 40 MG/1
40 TABLET, FILM COATED ORAL DAILY
Qty: 90 TABLET | Refills: 2 | Status: SHIPPED | OUTPATIENT
Start: 2024-08-27

## 2024-08-28 ENCOUNTER — TREATMENT (OUTPATIENT)
Age: 69
End: 2024-08-28
Payer: MEDICARE

## 2024-08-28 DIAGNOSIS — M51.36 LUMBAR DEGENERATIVE DISC DISEASE: Primary | ICD-10-CM

## 2024-08-28 DIAGNOSIS — M46.1 SI (SACROILIAC) JOINT INFLAMMATION: ICD-10-CM

## 2024-08-28 DIAGNOSIS — M79.605 LEFT LEG PAIN: ICD-10-CM
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
## 2024-08-28 NOTE — PATIENT INSTRUCTIONS
Access Code: [email or web address]  URL: [email or web address]  Date: 08/28/2024  Prepared by: Miley Quiles    Exercises  - Supine Hip Adduction Isometric with Ball  - 1 x daily - 7 x weekly - 1 sets - 10 reps - 5 sec. hold  - Supine Straight Leg Hip Adduction and Quad Set with Ball  - 1 x daily - 7 x weekly - 1 sets - 10 reps - 5 sec. hold  - Supine Piriformis Stretch with Foot on Ground  - 1 x daily - 7 x weekly - 1 sets - 3 reps - 30 sec. hold  - Seated Soleus Stretch with Strap  - 1 x daily - 7 x weekly - 1 sets - 3 reps - 30 sec. hold

## 2024-09-03 ENCOUNTER — HOSPITAL ENCOUNTER (OUTPATIENT)
Dept: MAMMOGRAPHY | Facility: HOSPITAL | Age: 69
Discharge: HOME | End: 2024-09-03
Admitting: STUDENT IN AN ORGANIZED HEALTH CARE EDUCATION/TRAINING PROGRAM
Payer: MEDICARE

## 2024-09-03 DIAGNOSIS — Z12.31 BREAST CANCER SCREENING BY MAMMOGRAM: ICD-10-CM

## 2024-09-03 PROCEDURE — 77063 BREAST TOMOSYNTHESIS BI: CPT

## 2024-09-03 PROCEDURE — 77067 SCR MAMMO BI INCL CAD: CPT

## 2024-09-10 ENCOUNTER — TREATMENT (OUTPATIENT)
Age: 69
End: 2024-09-10
Payer: MEDICARE

## 2024-09-10 DIAGNOSIS — M51.36 LUMBAR DEGENERATIVE DISC DISEASE: Primary | ICD-10-CM

## 2024-09-10 DIAGNOSIS — M46.1 SI (SACROILIAC) JOINT INFLAMMATION: ICD-10-CM

## 2024-09-10 DIAGNOSIS — M79.605 LEFT LEG PAIN: ICD-10-CM

## 2024-09-10 PROCEDURE — 97110 THERAPEUTIC EXERCISES: CPT | Performed by: PHYSICAL THERAPIST

## 2024-09-10 PROCEDURE — 97140 MANUAL THERAPY 1/> REGIONS: CPT | Performed by: PHYSICAL THERAPIST

## 2024-09-10 NOTE — PROGRESS NOTES
Physical Therapy Treatment Note  [facility] [place]   [address] [place] [address]  P: [phone]       F: [phone]      Patient: Daylin Rodriguez   : 1955  Treatment Diagnosis:     ICD-10-CM ICD-9-CM   1. Lumbar degenerative disc disease  M51.36 722.52   2. SI (sacroiliac) joint inflammation  M46.1 720.2   3. Left leg pain  M79.605 729.5     Referring practitioner: MARIA G Coppola  Date of Initial Visit: Type: THERAPY  Noted: 2024  Today's Date: 9/10/2024  Patient seen for 2 sessions           Subjective   Patient reports she has been feeling good until this morning she started having left low back pain.  States she did a lot of activities yesterday with bending working in the yard, doing laundry at home and also spent an extended period of time painting ceramics and she was bent over for the a large portion of that time.    Objective     See Exercise, Manual, and Modality Logs for complete treatment.       Assessment/Plan  Patient responded positively to manual therapy and progression of exercise program.  She had improved pelvic alignment, muscle tone and lumbar mobility.  Progressed HEP and provided written instructions for home use  Progress per Plan of Care           Timed:         Manual Therapy:    12     mins  39875;     Therapeutic Exercise:    20     mins  74968;     Neuromuscular Arlene:        mins  82167;    Therapeutic Activity:          mins  55518;     Gait Training:           mins  29310;     Ultrasound:          mins  91856;    Ionto                                  mins  79387  Self Care                            mins  38742  Canalith Repos         mins  18930  Orthotic MGMT/Train         mins  80919    Un-Timed:  Electrical Stimulation:         mins  22898 ( );  Dry Needling:          mins  74180 self-pay;  Dry Needling:          mins  37126 self-pay  Traction          mins  59560      Timed Treatment:   32   mins    Total Treatment:     32   mins        PT SIGNATURE: Miley Quiles, PT     License Number: [id number]  Electronically signed by Miley Quiles PT, 09/10/24, 10:07 AM EDT

## 2024-09-17 ENCOUNTER — TREATMENT (OUTPATIENT)
Age: 69
End: 2024-09-17
Payer: MEDICARE

## 2024-09-17 DIAGNOSIS — M79.605 LEFT LEG PAIN: ICD-10-CM

## 2024-09-17 DIAGNOSIS — M46.1 SI (SACROILIAC) JOINT INFLAMMATION: ICD-10-CM

## 2024-09-17 DIAGNOSIS — M51.36 LUMBAR DEGENERATIVE DISC DISEASE: Primary | ICD-10-CM

## 2024-09-17 PROCEDURE — 97110 THERAPEUTIC EXERCISES: CPT | Performed by: PHYSICAL THERAPIST

## 2024-09-23 ENCOUNTER — TELEPHONE (OUTPATIENT)
Dept: NEUROSURGERY | Facility: CLINIC | Age: 69
End: 2024-09-23
Payer: MEDICARE

## 2024-09-24 ENCOUNTER — TREATMENT (OUTPATIENT)
Age: 69
End: 2024-09-24
Payer: MEDICARE

## 2024-09-24 DIAGNOSIS — M51.369 LUMBAR DEGENERATIVE DISC DISEASE: Primary | ICD-10-CM

## 2024-09-24 DIAGNOSIS — M79.605 LEFT LEG PAIN: ICD-10-CM

## 2024-09-24 DIAGNOSIS — M46.1 SI (SACROILIAC) JOINT INFLAMMATION: ICD-10-CM

## 2024-09-24 PROCEDURE — 97140 MANUAL THERAPY 1/> REGIONS: CPT | Performed by: PHYSICAL THERAPIST

## 2024-09-24 PROCEDURE — 97110 THERAPEUTIC EXERCISES: CPT | Performed by: PHYSICAL THERAPIST

## 2024-10-01 ENCOUNTER — TREATMENT (OUTPATIENT)
Age: 69
End: 2024-10-01
Payer: MEDICARE

## 2024-10-01 DIAGNOSIS — M51.361 DEGENERATION OF INTERVERTEBRAL DISC OF LUMBAR REGION WITH LOWER EXTREMITY PAIN: Primary | ICD-10-CM

## 2024-10-01 DIAGNOSIS — M79.605 LEFT LEG PAIN: ICD-10-CM

## 2024-10-01 DIAGNOSIS — M46.1 SI (SACROILIAC) JOINT INFLAMMATION: ICD-10-CM

## 2024-10-01 PROCEDURE — 97110 THERAPEUTIC EXERCISES: CPT | Performed by: PHYSICAL THERAPIST

## 2024-10-01 PROCEDURE — 97140 MANUAL THERAPY 1/> REGIONS: CPT | Performed by: PHYSICAL THERAPIST
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
## 2024-10-07 ENCOUNTER — OFFICE VISIT (OUTPATIENT)
Dept: INTERNAL MEDICINE | Facility: CLINIC | Age: 69
End: 2024-10-07
Payer: MEDICARE

## 2024-10-07 VITALS
HEART RATE: 83 BPM | OXYGEN SATURATION: 96 % | BODY MASS INDEX: 24.94 KG/M2 | DIASTOLIC BLOOD PRESSURE: 80 MMHG | WEIGHT: 127 LBS | TEMPERATURE: 97.3 F | HEIGHT: 60 IN | SYSTOLIC BLOOD PRESSURE: 130 MMHG

## 2024-10-07 DIAGNOSIS — M54.42 CHRONIC BILATERAL LOW BACK PAIN WITH BILATERAL SCIATICA: ICD-10-CM

## 2024-10-07 DIAGNOSIS — M54.41 CHRONIC BILATERAL LOW BACK PAIN WITH BILATERAL SCIATICA: ICD-10-CM

## 2024-10-07 DIAGNOSIS — M47.816 LUMBAR SPONDYLOSIS: ICD-10-CM

## 2024-10-07 DIAGNOSIS — G89.29 CHRONIC BILATERAL LOW BACK PAIN WITH BILATERAL SCIATICA: ICD-10-CM

## 2024-10-07 DIAGNOSIS — Z78.0 POST-MENOPAUSAL: ICD-10-CM

## 2024-10-07 DIAGNOSIS — Z00.00 MEDICARE ANNUAL WELLNESS VISIT, SUBSEQUENT: Primary | ICD-10-CM

## 2024-10-07 LAB
ALBUMIN SERPL-MCNC: 4.5 G/DL (ref 3.5–5.2)
ALBUMIN/GLOB SERPL: 2.3 G/DL
ALP SERPL-CCNC: 112 U/L (ref 39–117)
ALT SERPL-CCNC: 23 U/L (ref 1–33)
AST SERPL-CCNC: 22 U/L (ref 1–32)
BASOPHILS # BLD AUTO: 0.03 10*3/MM3 (ref 0–0.2)
BASOPHILS NFR BLD AUTO: 0.6 % (ref 0–1.5)
BILIRUB SERPL-MCNC: <0.2 MG/DL (ref 0–1.2)
BUN SERPL-MCNC: 12 MG/DL (ref 8–23)
BUN/CREAT SERPL: 18.5 (ref 7–25)
CALCIUM SERPL-MCNC: 9.3 MG/DL (ref 8.6–10.5)
CHLORIDE SERPL-SCNC: 102 MMOL/L (ref 98–107)
CO2 SERPL-SCNC: 27.5 MMOL/L (ref 22–29)
CREAT SERPL-MCNC: 0.65 MG/DL (ref 0.57–1)
EGFRCR SERPLBLD CKD-EPI 2021: 94.3 ML/MIN/1.73
EOSINOPHIL # BLD AUTO: 0.11 10*3/MM3 (ref 0–0.4)
EOSINOPHIL NFR BLD AUTO: 2.2 % (ref 0.3–6.2)
ERYTHROCYTE [DISTWIDTH] IN BLOOD BY AUTOMATED COUNT: 13.1 % (ref 12.3–15.4)
GLOBULIN SER CALC-MCNC: 2 GM/DL
GLUCOSE SERPL-MCNC: 83 MG/DL (ref 65–99)
HCT VFR BLD AUTO: 38 % (ref 34–46.6)
HGB BLD-MCNC: 12.7 G/DL (ref 12–15.9)
IMM GRANULOCYTES # BLD AUTO: 0 10*3/MM3 (ref 0–0.05)
IMM GRANULOCYTES NFR BLD AUTO: 0 % (ref 0–0.5)
LYMPHOCYTES # BLD AUTO: 2.26 10*3/MM3 (ref 0.7–3.1)
LYMPHOCYTES NFR BLD AUTO: 45.1 % (ref 19.6–45.3)
MCH RBC QN AUTO: 32.9 PG (ref 26.6–33)
MCHC RBC AUTO-ENTMCNC: 33.4 G/DL (ref 31.5–35.7)
MCV RBC AUTO: 98.4 FL (ref 79–97)
MONOCYTES # BLD AUTO: 0.56 10*3/MM3 (ref 0.1–0.9)
MONOCYTES NFR BLD AUTO: 11.2 % (ref 5–12)
NEUTROPHILS # BLD AUTO: 2.05 10*3/MM3 (ref 1.7–7)
NEUTROPHILS NFR BLD AUTO: 40.9 % (ref 42.7–76)
NRBC BLD AUTO-RTO: 0 /100 WBC (ref 0–0.2)
PLATELET # BLD AUTO: 240 10*3/MM3 (ref 140–450)
POTASSIUM SERPL-SCNC: 4.4 MMOL/L (ref 3.5–5.2)
PROT SERPL-MCNC: 6.5 G/DL (ref 6–8.5)
RBC # BLD AUTO: 3.86 10*6/MM3 (ref 3.77–5.28)
SODIUM SERPL-SCNC: 137 MMOL/L (ref 136–145)
WBC # BLD AUTO: 5.01 10*3/MM3 (ref 3.4–10.8)

## 2024-10-07 PROCEDURE — 1160F RVW MEDS BY RX/DR IN RCRD: CPT | Performed by: STUDENT IN AN ORGANIZED HEALTH CARE EDUCATION/TRAINING PROGRAM

## 2024-10-07 PROCEDURE — 1170F FXNL STATUS ASSESSED: CPT | Performed by: STUDENT IN AN ORGANIZED HEALTH CARE EDUCATION/TRAINING PROGRAM

## 2024-10-07 PROCEDURE — G0439 PPPS, SUBSEQ VISIT: HCPCS | Performed by: STUDENT IN AN ORGANIZED HEALTH CARE EDUCATION/TRAINING PROGRAM

## 2024-10-07 PROCEDURE — 99214 OFFICE O/P EST MOD 30 MIN: CPT | Performed by: STUDENT IN AN ORGANIZED HEALTH CARE EDUCATION/TRAINING PROGRAM

## 2024-10-07 PROCEDURE — 1159F MED LIST DOCD IN RCRD: CPT | Performed by: STUDENT IN AN ORGANIZED HEALTH CARE EDUCATION/TRAINING PROGRAM

## 2024-10-07 PROCEDURE — 1125F AMNT PAIN NOTED PAIN PRSNT: CPT | Performed by: STUDENT IN AN ORGANIZED HEALTH CARE EDUCATION/TRAINING PROGRAM

## 2024-10-07 NOTE — PROGRESS NOTES
" Subjective   The ABCs of the Annual Wellness Visit  Medicare Wellness Visit      Daylin Rodriguez is a 69 y.o. patient who presents for a Medicare Wellness Visit.    The following portions of the patient's history were reviewed and   updated as appropriate: allergies, current medications, past family history, past medical history, past social history, past surgical history, and problem list.    Mild-to-moderate degenerative spondylosis lumbar: States she has back trouble for \"over 20 years\". Is currently in  physical therapy. Has had ablation and epidural injections with only partial improvement.  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 She is taking celebrex 100 mg twice daily and Tylenol arthritis depending on her pain level.   Neurosurgery recently started cyclobenzaprine 10 mg and she is using this once or twice daily. Daily pain level varies and depends on how much she does. She rates her pain as moderate. Her pain makes it difficult to thrive.     Epilepsy: follows with [facility]. Currently taking zonisamide and brand-name Dilantin. Has been seizure free since 2011.     Allergies: states she uses cetirizine as needed     Hyperlipidemia: takes atorvastatin 40 mg daily. For primary prevention.   Lab Results   Component Value Date    CHLPL 211 (H) 04/04/2024    TRIG 79 04/04/2024     (H) 04/04/2024    LDL 95 04/04/2024       Compared to one year ago, the patient's physical   health is the same.  Compared to one year ago, the patient's mental   health is the same.    Recent Hospitalizations:  She was not admitted to the hospital during the last year.     Current Medical Providers:  Patient Care Team:  Sam Clement DO as PCP - General (Internal Medicine)    Outpatient Medications Prior to Visit   Medication Sig Dispense Refill    atorvastatin (LIPITOR) 40 MG tablet TAKE 1 TABLET BY MOUTH DAILY 90 tablet 2    celecoxib " "(CeleBREX) 100 MG capsule Take 1 capsule by mouth 2 (Two) Times a Day. 180 capsule 1    cetirizine (zyrTEC) 10 MG tablet Take 1 tablet by mouth As Needed.      cyclobenzaprine (FLEXERIL) 10 MG tablet Take 1 tablet by mouth 3 (Three) Times a Day As Needed for Muscle Spasms. 60 tablet 2    Dilantin 100 MG capsule Take 3 capsules by mouth Daily. 270 capsule 3    Dilantin 30 MG ER capsule Take 1 capsule by mouth Every Other Day. 45 capsule 3    zonisamide (ZONEGRAN) 100 MG capsule Take 3 capsules by mouth Daily. 270 capsule 3     No facility-administered medications prior to visit.     No opioid medication identified on active medication list. I have reviewed chart for other potential  high risk medication/s and harmful drug interactions in the elderly.      Aspirin is not on active medication list.  Aspirin use is not indicated based on review of current medical condition/s. Risk of harm outweighs potential benefits.  .    Patient Active Problem List   Diagnosis    Partial idiopathic epilepsy with seizures of localized onset, not intractable, without status epilepticus    Adverse drug effect, subsequent encounter    Ataxia on examination    Osteopenia determined by x-ray    SI (sacroiliac) joint inflammation    Lumbar degenerative disc disease    Left leg pain     Advance Care Planning Advance Directive is not on file.  ACP discussion was held with the patient during this visit. Patient does not have an advance directive, information provided.            Objective   Vitals:    10/07/24 0915   BP: 130/80   Pulse: 83   Temp: 97.3 °F (36.3 °C)   TempSrc: Infrared   SpO2: 96%   Weight: 57.6 kg (127 lb)   Height: 152 cm (59.84\")   PainSc:   2   PainLoc: Back     Physical Exam  Vitals reviewed.   Constitutional:       General: She is not in acute distress.     Appearance: Normal appearance. She is normal weight. She is not ill-appearing.   Eyes:      General: No scleral icterus.  Cardiovascular:      Rate and Rhythm: Normal " "rate and regular rhythm.      Heart sounds: Normal heart sounds. No murmur heard.  Pulmonary:      Effort: Pulmonary effort is normal. No respiratory distress.      Breath sounds: Normal breath sounds. No wheezing.   Abdominal:      General: Bowel sounds are normal. There is no distension.      Palpations: Abdomen is soft.      Tenderness: There is no abdominal tenderness. There is no guarding.   Musculoskeletal:      Right lower leg: No edema.      Left lower leg: No edema.   Skin:     Coloration: Skin is not jaundiced.   Neurological:      Mental Status: She is alert.   Psychiatric:         Mood and Affect: Mood normal.         Behavior: Behavior normal.         Thought Content: Thought content normal.         Estimated body mass index is 24.94 kg/m² as calculated from the following:    Height as of this encounter: 152 cm (59.84\").    Weight as of this encounter: 57.6 kg (127 lb).    BMI is within normal parameters. No other follow-up for BMI required.       Does the patient have evidence of cognitive impairment? No                                                                                               Health  Risk Assessment    Smoking Status:  Social History     Tobacco Use   Smoking Status Never   Smokeless Tobacco Never     Alcohol Consumption:  Social History     Substance and Sexual Activity   Alcohol Use Never       Fall Risk Screen  STEADI Fall Risk Assessment was completed, and patient is at LOW risk for falls.Assessment completed on:10/7/2024    Depression Screening:      10/7/2024     9:19 AM   PHQ-2/PHQ-9 Depression Screening   Little Interest or Pleasure in Doing Things 0-->not at all   Feeling Down, Depressed or Hopeless 0-->not at all   PHQ-9: Brief Depression Severity Measure Score 0     Health Habits and Functional and Cognitive Screening:      10/7/2024     9:20 AM   Functional & Cognitive Status   Do you have difficulty preparing food and eating? No   Do you have difficulty bathing " yourself, getting dressed or grooming yourself? No   Do you have difficulty using the toilet? No   Do you have difficulty moving around from place to place? No   Do you have trouble with steps or getting out of a bed or a chair? No   Current Diet Well Balanced Diet   Dental Exam Up to date   Eye Exam Up to date   Exercise (times per week) 5 times per week   Current Exercises Include Walking        Exercise Comment Physical threpy  EXERCISES   Do you need help using the phone?  No   Are you deaf or do you have serious difficulty hearing?  No   Do you need help to go to places out of walking distance? No   Do you need help shopping? No   Do you need help preparing meals?  No   Do you need help with housework?  No   Do you need help with laundry? No   Do you need help taking your medications? No   Do you need help managing money? No   Do you ever drive or ride in a car without wearing a seat belt? No   Have you felt unusual stress, anger or loneliness in the last month? No   Who do you live with? Alone   If you need help, do you have trouble finding someone available to you? No   Do you have difficulty concentrating, remembering or making decisions? No           Age-appropriate Screening Schedule:  Refer to the list below for future screening recommendations based on patient's age, sex and/or medical conditions. Orders for these recommended tests are listed in the plan section. The patient has been provided with a written plan.    Health Maintenance List  Health Maintenance   Topic Date Due    INFLUENZA VACCINE  08/01/2024    COVID-19 Vaccine (6 - 2023-24 season) 09/01/2024    DXA SCAN  10/21/2024    LIPID PANEL  04/04/2025    ANNUAL WELLNESS VISIT  10/07/2025    COLORECTAL CANCER SCREENING  08/28/2026    MAMMOGRAM  09/03/2026    TDAP/TD VACCINES (2 - Td or Tdap) 09/28/2029    HEPATITIS C SCREENING  Completed    Pneumococcal Vaccine 65+  Completed    ZOSTER VACCINE  Completed                                              "                                                                                                   CMS Preventative Services Quick Reference  Risk Factors Identified During Encounter  Chronic Pain:  see plan below  Immunizations Discussed/Encouraged: Influenza and COVID19    The above risks/problems have been discussed with the patient.  Pertinent information has been shared with the patient in the After Visit Summary.  An After Visit Summary and PPPS were made available to the patient.    Follow Up:   Next Medicare Wellness visit to be scheduled in 1 year.       A problem-based visit was also conducted on the same day, see below for assessment and plan    Diagnoses and all orders for this visit:    1. Lumbar spondylosis (Primary)  2. Chronic bilateral low back pain with bilateral sciatica  -States she has back trouble for \"over 20 years\". Is currently in  physical therapy. Has had ablation and epidural injections with only partial improvement.  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 She is taking celebrex 100 mg twice daily and Tylenol arthritis depending on her pain level.   Neurosurgery recently started cyclobenzaprine 10 mg and she is using this once or twice daily. Daily pain level varies and depends on how much she does. She rates her pain as moderate. Her pain makes it difficult to thrive.   -I reviewed most recent neurosurgery progress note  -we discussed the potential to add on an SNRI such as duloxetine which can have some MSK pain lowering benefits. We discussed common side effects with this medication. I recommend she discuss with her neurology to ensure this medication is OK to use with her current epilepsy regimen. If so, I am agreeable to treatment.             The following social determinates of health impact the patient's medical decision making: No social determinates of health were factored in to today's visit. "     Follow Up  Return in about 6 months (around 4/7/2025) for Recheck.

## 2024-10-08 ENCOUNTER — TREATMENT (OUTPATIENT)
Age: 69
End: 2024-10-08
Payer: MEDICARE

## 2024-10-08 DIAGNOSIS — M46.1 SI (SACROILIAC) JOINT INFLAMMATION: ICD-10-CM

## 2024-10-08 DIAGNOSIS — M79.605 LEFT LEG PAIN: ICD-10-CM

## 2024-10-08 DIAGNOSIS — M51.361 DEGENERATION OF INTERVERTEBRAL DISC OF LUMBAR REGION WITH LOWER EXTREMITY PAIN: Primary | ICD-10-CM

## 2024-10-08 NOTE — PROGRESS NOTES
Physical Therapy Treatment Note  [facility] Therapy [place]   [address] [place] [address]  P: [phone]       F: [phone]      Patient: Daylin Rodriguez   : 1955  Treatment Diagnosis:     ICD-10-CM ICD-9-CM   1. Degeneration of intervertebral disc of lumbar region with lower extremity pain  M51.361 722.52   2. SI (sacroiliac) joint inflammation  M46.1 720.2   3. Left leg pain  M79.605 729.5     Referring practitioner: MARIA G Coppola  Date of Initial Visit: Type: THERAPY  Noted: 2024  Today's Date: 10/8/2024  Patient seen for 6 sessions           Subjective   Patient reports she has been having less pain and the symptoms flare the next day after being active-primarily after performing house hold chores.      Objective     See Exercise, Manual, and Modality Logs for complete treatment.       Assessment/Plan  Patient responded positively to manual therapy with improved neural tension in right lower extremity.  Progressed program with core strengthening and spinal decompression exercises.  She had improved symptoms post treatment.  Encourage patient to use ice to her low back for up to 10 minutes after doing household chores to aid with reducing pain flares later.  Progressed HEP and provided written instructions for home use.  Progress per Plan of Care           Timed:         Manual Therapy:    10     mins  54920;     Therapeutic Exercise:    30     mins  56350;     Neuromuscular Arlene:        mins  11452;    Therapeutic Activity:          mins  93676;     Gait Training:           mins  72801;     Ultrasound:          mins  26755;    Ionto                                  mins  94905  Self Care                            mins  81983  Canalith Repos         mins  29346  Orthotic MGMT/Train         mins  53034    Un-Timed:  Electrical Stimulation:         mins  04962 ( );  Dry Needling:          mins  73640 self-pay;  Dry Needling:          mins   85543 self-pay  Traction          mins  87345      Timed Treatment:   40   mins   Total Treatment:     40   mins        PT SIGNATURE: Miley Quiles PT     License Number: [id number]  Electronically signed by Miley Quiles PT, 10/08/24, 11:44 AM EDT

## 2024-10-15 ENCOUNTER — TREATMENT (OUTPATIENT)
Age: 69
End: 2024-10-15
Payer: MEDICARE

## 2024-10-15 DIAGNOSIS — M46.1 SI (SACROILIAC) JOINT INFLAMMATION: ICD-10-CM

## 2024-10-15 DIAGNOSIS — M79.605 LEFT LEG PAIN: ICD-10-CM

## 2024-10-15 DIAGNOSIS — M51.361 DEGENERATION OF INTERVERTEBRAL DISC OF LUMBAR REGION WITH LOWER EXTREMITY PAIN: Primary | ICD-10-CM

## 2024-10-15 PROCEDURE — 97110 THERAPEUTIC EXERCISES: CPT | Performed by: PHYSICAL THERAPIST

## 2024-10-15 PROCEDURE — 97112 NEUROMUSCULAR REEDUCATION: CPT | Performed by: PHYSICAL THERAPIST

## 2024-10-15 NOTE — PROGRESS NOTES
Physical Therapy Treatment Note  [facility] Physical Therapy [place]   [address] [place] [address]  P: [phone]       F: [phone]      Patient: Daylin Rodriguez   : 1955  Treatment Diagnosis:     ICD-10-CM ICD-9-CM   1. Degeneration of intervertebral disc of lumbar region with lower extremity pain  M51.361 722.52   2. SI (sacroiliac) joint inflammation  M46.1 720.2   3. Left leg pain  M79.605 729.5     Referring practitioner: MARIA G Coppola  Date of Initial Visit: Type: THERAPY  Noted: 2024  Today's Date: 10/15/2024  Patient seen for 7 sessions           Subjective   Patient reports she feels she has more stamina to stand/walk.  States she was able to shop the other day and did not have as much discomfort.      Objective     See Exercise, Manual, and Modality Logs for complete treatment.       Assessment/Plan  Patient presented with mild limp when walking this session..  Performed exercises for lumbopelvic mobility and stabilization.  Progressed program with dynamic core strengthening/stabilization.  Benefits from cueing for technique and to prevent compensatory patterns.  Progressed HEP and provided written instructions for home use.  Progress per Plan of Care           Timed:         Manual Therapy:         mins  11809;     Therapeutic Exercise:    30     mins  13844;     Neuromuscular Arlene:    8    mins  67902;    Therapeutic Activity:          mins  87799;     Gait Training:           mins  45076;     Ultrasound:          mins  39653;    Ionto                                  mins  80607  Self Care                            mins  11525  Canalith Repos         mins  38448  Orthotic MGMT/Train         mins  41550    Un-Timed:  Electrical Stimulation:         mins  49738 ( );  Dry Needling:          mins  87351 self-pay;  Dry Needling:          mins   self-pay  Traction          mins  39997      Timed Treatment:   38   mins   Total Treatment:      38   mins        PT SIGNATURE: Miley Quiles PT     License Number: [id number]  Electronically signed by Miley Quiles, PT, 10/15/24, 11:33 AM EDT

## 2024-10-22 NOTE — PROGRESS NOTES
"Subjective   Patient ID: Daylin Rodriguez is a 69 y.o. female is here today for follow-up for lumbar degenerative disc disease and left leg pain.    Treatment: Ongoing PT through [facility]    History of Present Illness    She was last seen in the office in August 2024 for bilateral low back pain, left greater than right and radiating left leg pain.  She got no relief after a prior SI joint injection.  She was referred to physical therapy and follows up in the office today.     Today, the patient is happy to report that she got significant pain relief since starting physical therapy.  She no longer has the constant pain in both of her legs.  Now she gets a little bit of intermittent low back discomfort, but has had almost complete resolution of the radiating leg pain.  She denies any balance or gait issues.  She takes muscle relaxers every evening, sometimes in the afternoon depending on how she is feeling.  She will keep doing the home PT exercises as needed.  Her last outpatient visit is scheduled for tomorrow.  She denies any new problems or concerns.  She presents unaccompanied.          The following portions of the patient's history were reviewed and updated as appropriate: allergies, current medications, past family history, past medical history, past social history, past surgical history, and problem list.    Review of Systems   Constitutional:  Negative for fever.   Genitourinary:  Negative for difficulty urinating.   Musculoskeletal:  Positive for back pain. Negative for gait problem.   Neurological:  Negative for dizziness, weakness, light-headedness, numbness and headaches.       /76   Pulse 89   Temp 97.1 °F (36.2 °C)   Resp 16   Ht 152 cm (59.84\")   Wt 58.1 kg (128 lb)   SpO2 100%   BMI 25.13 kg/m²       Objective     Vitals:    10/28/24 1000   BP: 128/76   Pulse: 89   Resp: 16   Temp: 97.1 °F (36.2 °C)   SpO2: 100%   Weight: 58.1 kg (128 lb)   Height: 152 cm " "(59.84\")     Body mass index is 25.13 kg/m².    Tobacco Use: Low Risk  (10/28/2024)    Patient History     Smoking Tobacco Use: Never     Smokeless Tobacco Use: Never     Passive Exposure: Not on file          Physical Exam  Vitals reviewed.   Constitutional:       Appearance: Normal appearance.   HENT:      Head: Atraumatic.   Pulmonary:      Effort: Pulmonary effort is normal.   Musculoskeletal:         General: No tenderness. Normal range of motion.      Comments: Moving all extremities well   strength appears equal and intact throughout     Skin:     General: Skin is warm and dry.   Neurological:      Mental Status: She is alert and oriented to person, place, and time.      GCS: GCS eye subscore is 4. GCS verbal subscore is 5. GCS motor subscore is 6.      Motor: No weakness or tremor.      Gait: Gait normal.      Comments: Stable upright gait, nonantalgic   Psychiatric:         Mood and Affect: Mood normal.       Neurological Exam  Mental Status  Alert. Oriented to person, place, and time.    Coordination  No tremor    Gait   Normal gait.          Assessment & Plan   Independent Review of Radiographic Studies:      I personally reviewed the images from the following studies.    No new imaging      Physical therapy notes reviewed      Medical Decision Making:      She presents office today for ongoing follow-up with history of bilateral low back and left leg pain.  Exam as noted above, no red flags.  She is doing great and has had almost complete pain relief after doing physical therapy.  She will keep doing the daily exercises at home.  She denies any new problems and is stable on exam today.  From our standpoint she is doing well and we will see her back in an as-needed basis.  She will call with any questions or concerns.  We will continue to fill her muscle relaxers as needed.  She needs an additional order for therapy she will let me know.    Plan: Return to office as needed    Diagnoses and all orders for " this visit:    1. SI (sacroiliac) joint inflammation (Primary)    2. Degeneration of intervertebral disc of lumbar region with discogenic back pain and lower extremity pain    Other orders  -     cyclobenzaprine (FLEXERIL) 10 MG tablet; Take 1 tablet by mouth 3 (Three) Times a Day As Needed for Muscle Spasms.  Dispense: 60 tablet; Refill: 6      Return if symptoms worsen or fail to improve.

## 2024-10-28 ENCOUNTER — OFFICE VISIT (OUTPATIENT)
Dept: NEUROSURGERY | Facility: CLINIC | Age: 69
End: 2024-10-28
Payer: MEDICARE

## 2024-10-28 VITALS
BODY MASS INDEX: 25.13 KG/M2 | DIASTOLIC BLOOD PRESSURE: 76 MMHG | TEMPERATURE: 97.1 F | OXYGEN SATURATION: 100 % | RESPIRATION RATE: 16 BRPM | HEIGHT: 60 IN | SYSTOLIC BLOOD PRESSURE: 128 MMHG | HEART RATE: 89 BPM | WEIGHT: 128 LBS

## 2024-10-28 DIAGNOSIS — M46.1 SI (SACROILIAC) JOINT INFLAMMATION: Primary | ICD-10-CM

## 2024-10-28 DIAGNOSIS — M51.362 DEGENERATION OF INTERVERTEBRAL DISC OF LUMBAR REGION WITH DISCOGENIC BACK PAIN AND LOWER EXTREMITY PAIN: ICD-10-CM

## 2024-10-28 PROCEDURE — 1159F MED LIST DOCD IN RCRD: CPT | Performed by: NURSE PRACTITIONER

## 2024-10-28 PROCEDURE — 99213 OFFICE O/P EST LOW 20 MIN: CPT | Performed by: NURSE PRACTITIONER

## 2024-10-28 PROCEDURE — 1160F RVW MEDS BY RX/DR IN RCRD: CPT | Performed by: NURSE PRACTITIONER

## 2024-10-28 RX ORDER — CYCLOBENZAPRINE HCL 10 MG
10 TABLET ORAL 3 TIMES DAILY PRN
Qty: 60 TABLET | Refills: 6 | Status: SHIPPED | OUTPATIENT
Start: 2024-10-28

## 2024-10-29 ENCOUNTER — TREATMENT (OUTPATIENT)
Age: 69
End: 2024-10-29
Payer: MEDICARE

## 2024-10-29 DIAGNOSIS — M79.605 LEFT LEG PAIN: ICD-10-CM

## 2024-10-29 DIAGNOSIS — M46.1 SI (SACROILIAC) JOINT INFLAMMATION: ICD-10-CM

## 2024-10-29 DIAGNOSIS — M51.361 DEGENERATION OF INTERVERTEBRAL DISC OF LUMBAR REGION WITH LOWER EXTREMITY PAIN: Primary | ICD-10-CM

## 2024-10-29 NOTE — PROGRESS NOTES
PHYSICAL THERAPY DISCHARGE SUMMARY  [facility] Physical Therapy [place]   [address] [place] [address]  P: [phone]       F: [phone]     Patient: Daylin Rodriguez   : 1955  Diagnosis/ICD-10 Code:  Degeneration of intervertebral disc of lumbar region with lower extremity pain [M51.361]  Referring practitioner: MARIA G Coppola  Date of Initial Visit: Type: THERAPY  Noted: 2024  Today's Date: 10/29/2024  Patient seen for 8 sessions      Subjective:   Daylin Rodriguez reports:   Subjective Questionnaire: Oswestry: 15/50  Clinical Progress: improved  Home Program Compliance: Yes  Treatment has included: therapeutic exercise, manual therapy, and therapeutic activity    Subjective  Patient reports she feels at least 70% improved since starting PT.  She is still having occasional symptoms but at a milder intensity and they are only present for short amount of time.  States she is comfortable with her home exercise program and managing her symptoms independently at this time.      Objective      Tenderness      Left Hip   Tenderness in the sacroiliac joint.      Right Hip   Tenderness in the sacroiliac joint.      Active Range of Motion      Lumbar   Flexion: 50 degrees   Extension: 20 degrees   Left lateral flexion: 15 degrees   Right lateral flexion: 15 degrees   Left rotation: 40 degrees  Right rotation: 40 degrees      Strength/Myotome Testing      Lumbar   Left   Heel walk: normal  Toe walk: normal     Right   Heel walk: abnormal  Toe walk: abnormal     Left Hip   Planes of Motion   Flexion: 4+  Extension: 4+  Abduction: 4+  External rotation: 4+  Internal rotation: 4+     Right Hip   Planes of Motion   Flexion: 4+  Extension: 4+  Abduction: 4+  External rotation: 4+  Internal rotation: 4+     Left Knee   Flexion: 5  Extension: 5     Right Knee   Flexion: 5  Extension: 5     Left Ankle/Foot   Dorsiflexion: 4+  Eversion: 4+  Great toe extension: 4+     Right  Ankle/Foot   Dorsiflexion: 4+  Eversion: 4+  Great toe extension: 4+     Muscle Activation      Additional Muscle Activation Details  Core muscle activation/stabilization: WFL     Tests      Lumbar      Left   Negative passive SLR.      Right   Negative passive SLR.      Left Hip   Negative piriformis.   Modified Troy: Negative.      Right Hip   Negative piriformis.   Modified Troy: Negative.      Assessment/Plan  Patient presents with improved lumbar spine range of motion and lower extremity strength.  SIJ alignment is symmetrical.  She has improved core muscle activation/stabilization to within functional limits.  Hip girdle and lower extremity muscle flexibility is also improved within functional limits.  She is independent in HEP.  Progress toward previous goals: All Met    Goal Review  Short Term Goals :  1.  Patient will have increased lumbar spine ROM to WFL.-met  2.  Patient will have symmetrical SIJ alignment.-met  3.  Patient will have increased core muscle activation to WFL.-met  4.  Patient will be independent in performance of HEP.-met     Long Term Goals:  1.  Patient will have increased LE strength to 4+/5 or better.-met  2.  Patient will have improved Oswestry score of 15/50 or better.-met  3.  Patient will have increased hip girdle muscle flexibility to WFL.-met  4.  Patient will report improved tolerance to performing ADLs/IADLs by 25% or more.-met      Recommendations: Discharge to [facility]    PT SIGNATURE: Miley Quiles PT     License Number: [id number]  Electronically signed by Miley Quiles PT, 10/29/24, 11:38 AM EDT    Timed:         Manual Therapy:         mins  56387;     Therapeutic Exercise:    30     mins  72883;     Neuromuscular Arlene:        mins  26659;    Therapeutic Activity:     15     mins  31051;     Gait Training:           mins  35294;     Ultrasound:          mins  09046;    Ionto                                  mins  23621  Self Care                            mins   45440  Canalith Repos         mins  96220  Orthotic MGMT/Train         mins  11883    Un-Timed:  Electrical Stimulation:         mins  78129 ( );  Dry Needling:          mins  61586 self-pay;  Dry Needling:          mins  38813 self-pay  Traction          mins  87905  Low Eval          mins  46355  Mod Eval          mins  48464  High Eval                            mins  22210    Timed Treatment:   45   mins   Total Treatment:     45   mins

## 2024-11-05 ENCOUNTER — HOSPITAL ENCOUNTER (OUTPATIENT)
Facility: HOSPITAL | Age: 69
Discharge: HOME | End: 2024-11-05
Admitting: STUDENT IN AN ORGANIZED HEALTH CARE EDUCATION/TRAINING PROGRAM
Payer: MEDICARE

## 2024-11-05 PROCEDURE — 77080 DXA BONE DENSITY AXIAL: CPT

## 2024-12-18 DIAGNOSIS — G89.29 CHRONIC BILATERAL LOW BACK PAIN WITH BILATERAL SCIATICA: ICD-10-CM

## 2024-12-18 DIAGNOSIS — M54.41 CHRONIC BILATERAL LOW BACK PAIN WITH BILATERAL SCIATICA: ICD-10-CM

## 2024-12-18 DIAGNOSIS — M54.42 CHRONIC BILATERAL LOW BACK PAIN WITH BILATERAL SCIATICA: ICD-10-CM

## 2024-12-18 DIAGNOSIS — M47.816 LUMBAR SPONDYLOSIS: ICD-10-CM

## 2024-12-22 RX ORDER — CELECOXIB 100 MG/1
100 CAPSULE ORAL 2 TIMES DAILY
Qty: 180 CAPSULE | Refills: 1 | Status: SHIPPED | OUTPATIENT
Start: 2024-12-22

## 2025-03-11 DIAGNOSIS — M46.1 SI (SACROILIAC) JOINT INFLAMMATION: Primary | ICD-10-CM

## 2025-03-11 DIAGNOSIS — M51.362 DEGENERATION OF INTERVERTEBRAL DISC OF LUMBAR REGION WITH DISCOGENIC BACK PAIN AND LOWER EXTREMITY PAIN: ICD-10-CM

## 2025-04-07 ENCOUNTER — OFFICE VISIT (OUTPATIENT)
Dept: INTERNAL MEDICINE | Facility: CLINIC | Age: 70
End: 2025-04-07
Payer: MEDICARE

## 2025-04-07 VITALS
HEART RATE: 108 BPM | WEIGHT: 128 LBS | HEIGHT: 60 IN | OXYGEN SATURATION: 99 % | BODY MASS INDEX: 25.13 KG/M2 | SYSTOLIC BLOOD PRESSURE: 120 MMHG | TEMPERATURE: 97.7 F | DIASTOLIC BLOOD PRESSURE: 78 MMHG

## 2025-04-07 DIAGNOSIS — E78.00 PURE HYPERCHOLESTEROLEMIA: Primary | ICD-10-CM

## 2025-04-07 DIAGNOSIS — S69.91XA JAMMED INTERPHALANGEAL JOINT OF FINGER OF RIGHT HAND, INITIAL ENCOUNTER: ICD-10-CM

## 2025-04-07 LAB
CHOLEST SERPL-MCNC: 218 MG/DL (ref 0–200)
HDLC SERPL-MCNC: 115 MG/DL (ref 40–60)
LDLC SERPL CALC-MCNC: 93 MG/DL (ref 0–100)
TRIGL SERPL-MCNC: 55 MG/DL (ref 0–150)
VLDLC SERPL CALC-MCNC: 10 MG/DL (ref 5–40)

## 2025-04-07 NOTE — PROGRESS NOTES
"  Sam Clement DO, [facility]  Internal Medicine  [facility]  [address], [address]  [place] [address]  [phone]    Chief Complaint  High cholesterol follow up  \"Jammed finger\".     SUBJECTIVE    History of Present Illness    Daylin Rodriguez is a 70 y.o. female who presents to the office today as an established patient that last saw me on 10/7/2024.     Hyperlipidemia: takes atorvastatin 40 mg daily. For primary prevention. States her diet is \"medium, some \" on fast/fatty/greasy foods. She eats a mix of red meats and chicken/turkey. She states her main exercise is physical therapy because of her back.  Lab Results   Component Value Date    CHLPL 211 (H) 04/04/2024    TRIG 79 04/04/2024     (H) 04/04/2024    LDL 95 04/04/2024     States she was going down the steps , and \"jammed \" her finger because she was carrying something. This happened a few months ago. States it hurts a little bit at the middle knuckle. States for the majority of the time it was \"just swollen\" but for the last two or three  weeks it has been frozen.     Allergies   Allergen Reactions    No Known Drug Allergy         Outpatient Medications Marked as Taking for the 4/7/25 encounter (Office Visit) with Sam Clement DO   Medication Sig Dispense Refill    atorvastatin (LIPITOR) 40 MG tablet Take 1 tablet by mouth Daily. 90 tablet 2    celecoxib (CeleBREX) 100 MG capsule Take 1 capsule by mouth 2 (Two) Times a Day. 180 capsule 1    cetirizine (zyrTEC) 10 MG tablet Take 1 tablet by mouth As Needed.      cyclobenzaprine (FLEXERIL) 10 MG tablet Take 1 tablet by mouth 3 (Three) Times a Day As Needed for Muscle Spasms. 60 tablet 6    Dilantin 100 MG capsule Take 3 capsules by mouth Daily. 270 capsule 3    Dilantin 30 MG ER capsule Take 1 capsule by mouth Every Other Day. 45 capsule 3    zonisamide (ZONEGRAN) 100 MG capsule Take 3 capsules by mouth Daily. 270 capsule 3        Past Medical History:   Diagnosis Date " "   Allergies     Hyperlipidemia 2020    Lumbar spondylosis     Seizures     Vision loss 2000    wear glasses       OBJECTIVE    Vital Signs:   /78   Pulse 108   Temp 97.7 °F (36.5 °C) (Infrared)   Ht 152 cm (59.84\")   Wt 58.1 kg (128 lb)   SpO2 99%   BMI 25.13 kg/m²        Physical Exam  Vitals reviewed.   Constitutional:       General: She is not in acute distress.     Appearance: Normal appearance. She is not ill-appearing.   Eyes:      General: No scleral icterus.  Pulmonary:      Effort: Pulmonary effort is normal. No respiratory distress.   Musculoskeletal:      Comments: Right second finger is fixed in a partially flexed position of the DIP and PIP joints. There is slight TTP at the PIP joint. There is no soft tissue swelling nor overlying skin erythema.    Neurological:      Mental Status: She is alert.   Psychiatric:         Mood and Affect: Mood normal.         Behavior: Behavior normal.         Thought Content: Thought content normal.                             ASSESSMENT & PLAN     Diagnoses and all orders for this visit:    1. Pure hypercholesterolemia (Primary)  -chronic controlled  -takes atorvastatin 40 mg daily. For primary prevention. States her diet is \"medium, some \" on fast/fatty/greasy foods. She eats a mix of red meats and chicken/turkey. She states her main exercise is physical therapy because of her back.  Lab Results   Component Value Date    CHLPL 211 (H) 04/04/2024    TRIG 79 04/04/2024     (H) 04/04/2024    LDL 95 04/04/2024         -recheck fasting lipid profile for continued annual monitoring on her current dosage of atorvastatin. As of last year she had great control. Goal LDL less than 100.  -     Lipid Panel    2. Jammed interphalangeal joint of finger of right hand, initial encounter  -     Ambulatory Referral to Hand Surgery            Follow Up  Return in about 6 months (around 10/7/2025) for Medicare Wellness.    Patient/family had no further questions at this " time and verbalized understanding of the plan discussed today.

## 2025-05-08 ENCOUNTER — OFFICE VISIT (OUTPATIENT)
Dept: NEUROLOGY | Facility: CLINIC | Age: 70
End: 2025-05-08
Payer: MEDICARE

## 2025-05-08 VITALS
HEART RATE: 86 BPM | OXYGEN SATURATION: 97 % | WEIGHT: 128 LBS | HEIGHT: 60 IN | SYSTOLIC BLOOD PRESSURE: 122 MMHG | DIASTOLIC BLOOD PRESSURE: 70 MMHG | BODY MASS INDEX: 25.13 KG/M2

## 2025-05-08 DIAGNOSIS — G40.009 PARTIAL IDIOPATHIC EPILEPSY WITH SEIZURES OF LOCALIZED ONSET, NOT INTRACTABLE, WITHOUT STATUS EPILEPTICUS: Primary | ICD-10-CM

## 2025-05-08 PROCEDURE — 1159F MED LIST DOCD IN RCRD: CPT | Performed by: STUDENT IN AN ORGANIZED HEALTH CARE EDUCATION/TRAINING PROGRAM

## 2025-05-08 PROCEDURE — 1160F RVW MEDS BY RX/DR IN RCRD: CPT | Performed by: STUDENT IN AN ORGANIZED HEALTH CARE EDUCATION/TRAINING PROGRAM

## 2025-05-08 PROCEDURE — 99214 OFFICE O/P EST MOD 30 MIN: CPT | Performed by: STUDENT IN AN ORGANIZED HEALTH CARE EDUCATION/TRAINING PROGRAM

## 2025-05-08 RX ORDER — ZONISAMIDE 100 MG/1
300 CAPSULE ORAL DAILY
Qty: 270 CAPSULE | Refills: 3 | Status: SHIPPED | OUTPATIENT
Start: 2025-05-08

## 2025-05-08 RX ORDER — EXTENDED PHENYTOIN SODIUM 30 MG/1
30 CAPSULE, EXTENDED RELEASE ORAL EVERY OTHER DAY
Qty: 45 CAPSULE | Refills: 3 | Status: SHIPPED | OUTPATIENT
Start: 2025-05-08

## 2025-05-08 RX ORDER — PHENYTOIN SODIUM 100 MG/1
300 CAPSULE, EXTENDED RELEASE ORAL DAILY
Qty: 270 CAPSULE | Refills: 3 | Status: SHIPPED | OUTPATIENT
Start: 2025-05-08

## 2025-05-08 NOTE — PROGRESS NOTES
Chief Complaint   Patient presents with    Seizures       Patient ID: Daylin Rodriguez is a 70 y.o. female.    HPI:    The following portions of the patient's history were reviewed and updated as appropriate: allergies, current medications, past family history, past medical history, past social history, past surgical history and problem list.    Interval history:    Review of Systems   Neurological:  Negative for dizziness, tremors, seizures, syncope, facial asymmetry, speech difficulty, weakness, light-headedness, numbness and headaches.         Ms. Rollins is a 70-year-old right-handed female with a history of epilepsy.  She presents to the neurology clinic as an established patient for follow-up of seizures.   He has had 2 large seizures in 1997 in 2001 as well as smaller seizures with spacing out lasting 1 to 2 minutes.  She has remained seizure-free since 2011.  She takes Dilantin extended release 300 mg alternating with 330 mg.  She also takes zonisamide 300 mg at night.  Her vitamin D was checked in August 2021 and was low.  She had a CBC which was notable for a leukopenia at 4.07, her CMP was checked around this time as well and was reassuring.  She had a phenytoin level of 28.1 when checked a year ago with a normal free level. No side effects. No seizures since last appointment. The medication is affordable to her. Last DEXA Nov 2024  and normal. Takes brand name Dilantin.  Some word finding difficulties but tolerable on zonisamide. Affordable and no intolerable side effects.    Has not tried other medications.    MRI/EEG - done years ago but they did not find anything abnormal on MRI. Unclear what EEG stated.       Vitals:    05/08/25 0958   BP: 122/70   Pulse: 86   SpO2: 97%       Neurological Exam    Physical Exam    Procedures    Assessment/Plan:    Patient has a diagnosis of epilepsy and she is seizure-free since 2011 on the current medication regimen she is on.  I will continue this for now and  monitor for side effects such as osteoporosis.  Nov 2024 DEXA normal.  Plan:  -seizure precautions discussed including avoiding open flames, open bodies of water (pt can't swim), heavy machinery, and heights greater than her own height.  -[place] state law re: no driving for 3 months from last seizure  -The patient has epilepsy which is a chronic condition that poses a threat to life or serious harm  -Prescription medications are managed in this visit.  The patient should continue her zonisamide 300 mg nightly and Dilantin brand-name 330 mg/300 mg alternating at night     Diagnoses and all orders for this visit:    1. Partial idiopathic epilepsy with seizures of localized onset, not intractable, without status epilepticus (Primary)  -     Zonisamide Level  -     Phenytoin Level, Total    Other orders  -     Dilantin 100 MG capsule; Take 3 capsules by mouth Daily.  Dispense: 270 capsule; Refill: 3  -     Dilantin 30 MG ER capsule; Take 1 capsule by mouth Every Other Day.  Dispense: 45 capsule; Refill: 3  -     zonisamide (ZONEGRAN) 100 MG capsule; Take 3 capsules by mouth Daily.  Dispense: 270 capsule; Refill: 3           Goldy Lowry MD

## 2025-05-12 LAB
PHENYTOIN SERPL-MCNC: 27.1 UG/ML (ref 10–20)
ZONISAMIDE SERPL-MCNC: 14.5 UG/ML (ref 10–40)

## 2025-06-25 DIAGNOSIS — E78.00 PURE HYPERCHOLESTEROLEMIA: ICD-10-CM

## 2025-06-25 RX ORDER — ATORVASTATIN CALCIUM 40 MG/1
40 TABLET, FILM COATED ORAL DAILY
Qty: 90 TABLET | Refills: 2 | Status: SHIPPED | OUTPATIENT
Start: 2025-06-25

## 2025-07-08 ENCOUNTER — TRANSCRIBE ORDERS (OUTPATIENT)
Dept: ADMINISTRATIVE | Facility: HOSPITAL | Age: 70
End: 2025-07-08
Payer: MEDICARE

## 2025-07-08 DIAGNOSIS — M54.41 CHRONIC BILATERAL LOW BACK PAIN WITH BILATERAL SCIATICA: ICD-10-CM

## 2025-07-08 DIAGNOSIS — M54.42 CHRONIC BILATERAL LOW BACK PAIN WITH BILATERAL SCIATICA: ICD-10-CM

## 2025-07-08 DIAGNOSIS — M47.816 LUMBAR SPONDYLOSIS: ICD-10-CM

## 2025-07-08 DIAGNOSIS — Z12.31 VISIT FOR SCREENING MAMMOGRAM: Primary | ICD-10-CM

## 2025-07-08 DIAGNOSIS — G89.29 CHRONIC BILATERAL LOW BACK PAIN WITH BILATERAL SCIATICA: ICD-10-CM

## 2025-07-08 RX ORDER — CELECOXIB 100 MG/1
100 CAPSULE ORAL 2 TIMES DAILY
Qty: 180 CAPSULE | Refills: 1 | Status: SHIPPED | OUTPATIENT
Start: 2025-07-08

## 2025-08-04 ENCOUNTER — OFFICE VISIT (OUTPATIENT)
Dept: INTERNAL MEDICINE | Facility: CLINIC | Age: 70
End: 2025-08-04
Payer: MEDICARE

## 2025-08-04 VITALS
SYSTOLIC BLOOD PRESSURE: 130 MMHG | WEIGHT: 129.4 LBS | TEMPERATURE: 97.5 F | HEIGHT: 60 IN | DIASTOLIC BLOOD PRESSURE: 70 MMHG | BODY MASS INDEX: 25.4 KG/M2 | OXYGEN SATURATION: 96 % | HEART RATE: 80 BPM

## 2025-08-04 DIAGNOSIS — M54.42 CHRONIC BILATERAL LOW BACK PAIN WITH BILATERAL SCIATICA: ICD-10-CM

## 2025-08-04 DIAGNOSIS — M54.41 CHRONIC BILATERAL LOW BACK PAIN WITH BILATERAL SCIATICA: ICD-10-CM

## 2025-08-04 DIAGNOSIS — M47.816 LUMBAR SPONDYLOSIS: Primary | ICD-10-CM

## 2025-08-04 DIAGNOSIS — G89.29 CHRONIC BILATERAL LOW BACK PAIN WITH BILATERAL SCIATICA: ICD-10-CM

## 2025-08-04 PROCEDURE — 1125F AMNT PAIN NOTED PAIN PRSNT: CPT | Performed by: STUDENT IN AN ORGANIZED HEALTH CARE EDUCATION/TRAINING PROGRAM

## 2025-08-04 PROCEDURE — G2211 COMPLEX E/M VISIT ADD ON: HCPCS | Performed by: STUDENT IN AN ORGANIZED HEALTH CARE EDUCATION/TRAINING PROGRAM

## 2025-08-04 PROCEDURE — 99214 OFFICE O/P EST MOD 30 MIN: CPT | Performed by: STUDENT IN AN ORGANIZED HEALTH CARE EDUCATION/TRAINING PROGRAM

## 2025-08-05 ENCOUNTER — PATIENT MESSAGE (OUTPATIENT)
Dept: INTERNAL MEDICINE | Facility: CLINIC | Age: 70
End: 2025-08-05
Payer: MEDICARE

## 2025-08-05 DIAGNOSIS — M54.41 CHRONIC BILATERAL LOW BACK PAIN WITH BILATERAL SCIATICA: ICD-10-CM

## 2025-08-05 DIAGNOSIS — G89.29 CHRONIC BILATERAL LOW BACK PAIN WITH BILATERAL SCIATICA: ICD-10-CM

## 2025-08-05 DIAGNOSIS — M54.42 CHRONIC BILATERAL LOW BACK PAIN WITH BILATERAL SCIATICA: ICD-10-CM

## 2025-08-05 DIAGNOSIS — M47.816 LUMBAR SPONDYLOSIS: Primary | ICD-10-CM

## 2025-08-09 RX ORDER — PREGABALIN 75 MG/1
75 CAPSULE ORAL 2 TIMES DAILY
Qty: 28 CAPSULE | Refills: 0 | Status: SHIPPED | OUTPATIENT
Start: 2025-08-09

## 2025-08-25 DIAGNOSIS — M54.41 CHRONIC BILATERAL LOW BACK PAIN WITH BILATERAL SCIATICA: ICD-10-CM

## 2025-08-25 DIAGNOSIS — M54.42 CHRONIC BILATERAL LOW BACK PAIN WITH BILATERAL SCIATICA: ICD-10-CM

## 2025-08-25 DIAGNOSIS — G89.29 CHRONIC BILATERAL LOW BACK PAIN WITH BILATERAL SCIATICA: ICD-10-CM

## 2025-08-25 DIAGNOSIS — M47.816 LUMBAR SPONDYLOSIS: ICD-10-CM

## 2025-08-25 RX ORDER — PREGABALIN 75 MG/1
75 CAPSULE ORAL 2 TIMES DAILY
Qty: 60 CAPSULE | Refills: 1 | Status: SHIPPED | OUTPATIENT
Start: 2025-08-25